# Patient Record
Sex: FEMALE | Race: BLACK OR AFRICAN AMERICAN | NOT HISPANIC OR LATINO | Employment: FULL TIME | URBAN - METROPOLITAN AREA
[De-identification: names, ages, dates, MRNs, and addresses within clinical notes are randomized per-mention and may not be internally consistent; named-entity substitution may affect disease eponyms.]

---

## 2023-02-17 PROBLEM — J32.9 SINUSITIS: Status: ACTIVE | Noted: 2023-02-17

## 2023-02-17 PROBLEM — I47.19 AV NODAL RE-ENTRY TACHYCARDIA (CMS-HCC): Status: ACTIVE | Noted: 2023-02-17

## 2023-02-17 PROBLEM — I27.20 PULMONARY HYPERTENSION (MULTI): Status: ACTIVE | Noted: 2023-02-17

## 2023-02-17 PROBLEM — J01.00 ACUTE NON-RECURRENT MAXILLARY SINUSITIS: Status: ACTIVE | Noted: 2023-02-17

## 2023-02-17 PROBLEM — J01.90 ACUTE SINUSITIS: Status: ACTIVE | Noted: 2023-02-17

## 2023-02-17 PROBLEM — R91.8 PULMONARY NODULES: Status: ACTIVE | Noted: 2023-02-17

## 2023-02-17 PROBLEM — H66.93 BOM (BILATERAL OTITIS MEDIA): Status: ACTIVE | Noted: 2023-02-17

## 2023-02-17 PROBLEM — E11.9 TYPE 2 DIABETES MELLITUS (MULTI): Status: ACTIVE | Noted: 2023-02-17

## 2023-02-17 PROBLEM — M19.90 ARTHRITIS, DEGENERATIVE: Status: ACTIVE | Noted: 2023-02-17

## 2023-02-17 PROBLEM — H69.92 DYSFUNCTION OF LEFT EUSTACHIAN TUBE: Status: ACTIVE | Noted: 2023-02-17
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
## 2023-02-17 PROBLEM — I10 HYPERTENSION: Status: ACTIVE | Noted: 2023-02-17

## 2023-02-17 PROBLEM — R01.1 MURMUR, CARDIAC: Status: ACTIVE | Noted: 2023-02-17

## 2023-02-17 PROBLEM — E53.8 VITAMIN B12 DEFICIENCY: Status: ACTIVE | Noted: 2023-02-17

## 2023-02-17 PROBLEM — E03.9 HYPOTHYROIDISM: Status: ACTIVE | Noted: 2023-02-17

## 2023-02-17 PROBLEM — E13.69: Status: ACTIVE | Noted: 2023-02-17

## 2023-02-17 PROBLEM — R05.9 COUGH: Status: ACTIVE | Noted: 2023-02-17

## 2023-02-17 PROBLEM — R92.8 ABNORMAL MAMMOGRAM: Status: ACTIVE | Noted: 2023-02-17

## 2023-02-17 PROBLEM — S43.402A SPRAIN OF SHOULDER, LEFT: Status: ACTIVE | Noted: 2023-02-17

## 2023-02-17 PROBLEM — E78.5 HYPERLIPIDEMIA: Status: ACTIVE | Noted: 2023-02-17

## 2023-02-17 PROBLEM — E04.1 THYROID NODULE: Status: ACTIVE | Noted: 2023-02-17

## 2023-02-17 PROBLEM — E55.9 VITAMIN D DEFICIENCY: Status: ACTIVE | Noted: 2023-02-17

## 2023-02-17 PROBLEM — E66.9 OBESITY WITH BODY MASS INDEX 30 OR GREATER: Status: ACTIVE | Noted: 2023-02-17

## 2023-02-17 PROBLEM — E78.00: Status: ACTIVE | Noted: 2023-02-17

## 2023-02-17 PROBLEM — G47.00 INSOMNIA: Status: ACTIVE | Noted: 2023-02-17

## 2023-02-17 PROBLEM — F17.200 NICOTINE DEPENDENCE: Status: ACTIVE | Noted: 2023-02-17

## 2023-02-17 PROBLEM — M25.539 PAIN, WRIST: Status: ACTIVE | Noted: 2023-02-17

## 2023-02-17 RX ORDER — NADOLOL 40 MG/1
40 TABLET ORAL 2 TIMES DAILY
COMMUNITY

## 2023-02-17 RX ORDER — LISINOPRIL 40 MG/1
40 TABLET ORAL
COMMUNITY
Start: 2014-03-26

## 2023-02-17 RX ORDER — ACETAMINOPHEN 500 MG
50 TABLET ORAL
COMMUNITY
End: 2023-04-11

## 2023-02-17 RX ORDER — METFORMIN HYDROCHLORIDE 500 MG/1
1000 TABLET, EXTENDED RELEASE ORAL
COMMUNITY
Start: 2016-01-06

## 2023-02-17 RX ORDER — NIFEDIPINE 60 MG/1
60 TABLET, FILM COATED, EXTENDED RELEASE ORAL
COMMUNITY

## 2023-02-17 RX ORDER — VALACYCLOVIR HYDROCHLORIDE 500 MG/1
500 TABLET, FILM COATED ORAL
COMMUNITY
End: 2023-04-11

## 2023-02-17 RX ORDER — ROSUVASTATIN CALCIUM 10 MG/1
10 TABLET, COATED ORAL
COMMUNITY
Start: 2018-11-15

## 2023-04-06 NOTE — PROGRESS NOTES
SUBJECTIVE:      Kristie Ferro. Is 62 y.o.. female. Here for follow up office visit-     HPI:      Follow up for BP, [place], prediabetes, CKD, vit d, elevated BMI      Pt is doing well      Due for lab     Other medical specialists are involved in patient's care.    Any recent notes that were available were reviewed.    All conditions are monitored, evaluated and assessed regularly.    Patient is compliant with current treatment regimen/medications.    Specialists involved include:      Nina Stark CNP PN 4/4/23- thyroid, DM         REVIEW OF SYSTEMS:      There were no vitals filed for this visit.        PHYSICAL:      Patient is alert and oriented x 3 , NAD  HEAD- normocephalic and atraumatic   EYES-conjunctiva-normal, lids - normal  EARS/NOSE- normal external exam   NECK-supple,FROM  CV- RRR without murmur  PULM- CTA bilaterally, normal respiratory effort  RESPIRATORY EFFORT- normal , no retractions or nasal flaring   ABD- normoactive BS's   EXT- no edema,NT  SKIN- no abnormal skin lesions noted  NEURO- no focal deficits  PSYCH- pleasant, normal judgement and insight        The number and complexity of problems addressed is considered moderate.  The amount and/or complexity of data reviewed and analyzed is considered moderate. The risk of complications and/or morbidity/mortality of patient is considered moderate. Overall, this patient encounter is considered a moderate risk visit.      Statin  CVD risk Reduction:    The USPSTF found adequate evidence that use of low- to  moderate-dose statins reduces the probability of CVD  events (MI or ischemic stroke) and mortality by at least  a moderate amount in adults aged 40 to 75 years who  have 1 or more CVD risk factors (dyslipidemia, diabetes,  hypertension, or smoking) and a calculated 10-year CVD  event risk of 10% or greater.  The USPSTF found adequate evidence that use of lowto moderate-dose statins reduces the probability of CVD  events and mortality by at  least a small amount in adults  aged 40 to 75 years who have 1 or more CVD risk factors  (dyslipidemia, diabetes, hypertension, or smoking) and  a calculated 10-year CVD event risk of 7.5% to 10%.  The USPSTF found inadequate evidence to conclude  whether initiating statin use in adults 76 years and older  who are not already taking a statin is beneficial in reducing the incidence of CVD events and mortality.  Cardiovascular Intensive Behavioral Therapy, Individual, 15 Minutes  I spent 15 minutes face-to-face with this individual discussing their cardiovascular risk and behavioral therapies of nutritional choices, exercise, and elimination of habits contributing to risk.  We agreed on a plan how they may be able to reduce their current cardiovascular risk.  For patient with risk calculation >10%, Aspirin use was discussed and encouraged unless known allergy or increased risk of bleeding contraindicates use.     Patients 10 year CV risk estimate calculates:  ___%    Although side effects believed to be caused by statins can be annoying, consider the benefits of taking a statin before you decide to stop taking your medication. Remember that statin medications can reduce your risk of a heart attack or stroke, and the risk of life-threatening side effects from statins is very low.    If you have read about the potential side effects of statins, you may be more likely to blame your symptoms on the medication, whether or not they're truly caused by the drug.    Even if your side effects are frustrating, don't stop taking your statin medication for any period of time without talking to your doctor first. Your doctor may be able to come up with an alternative treatment plan that can help you lower your cholesterol without uncomfortable side effects.    Side effects that may occur include muscle aches, elevation liver enzymes, very small incidence memory loss or confusion and increasing blood  sugar.      ASSESSMENT/PLAN:      No orders of the defined types were placed in this encounter.          Continue medication      Ordered lab      Follow up in 6 months

## 2023-04-11 ENCOUNTER — LAB (OUTPATIENT)
Dept: LAB | Facility: LAB | Age: 63
End: 2023-04-11
Payer: COMMERCIAL

## 2023-04-11 ENCOUNTER — OFFICE VISIT (OUTPATIENT)
Dept: PRIMARY CARE | Facility: CLINIC | Age: 63
End: 2023-04-11
Payer: COMMERCIAL

## 2023-04-11 VITALS
BODY MASS INDEX: 32.35 KG/M2 | WEIGHT: 194.4 LBS | SYSTOLIC BLOOD PRESSURE: 137 MMHG | DIASTOLIC BLOOD PRESSURE: 76 MMHG | HEART RATE: 61 BPM | OXYGEN SATURATION: 98 % | TEMPERATURE: 98.1 F | RESPIRATION RATE: 12 BRPM

## 2023-04-11 DIAGNOSIS — E78.00 HYPERCHOLESTEREMIA: ICD-10-CM

## 2023-04-11 DIAGNOSIS — E55.9 VITAMIN D DEFICIENCY: ICD-10-CM

## 2023-04-11 DIAGNOSIS — E11.9 TYPE 2 DIABETES MELLITUS WITHOUT COMPLICATION, WITHOUT LONG-TERM CURRENT USE OF INSULIN (MULTI): ICD-10-CM

## 2023-04-11 DIAGNOSIS — G47.00 INSOMNIA, UNSPECIFIED TYPE: ICD-10-CM

## 2023-04-11 DIAGNOSIS — E78.00 HYPERCHOLESTEROLEMIA: ICD-10-CM

## 2023-04-11 DIAGNOSIS — I10 PRIMARY HYPERTENSION: Primary | ICD-10-CM

## 2023-04-11 DIAGNOSIS — I10 PRIMARY HYPERTENSION: ICD-10-CM

## 2023-04-11 DIAGNOSIS — E03.9 HYPOTHYROIDISM, UNSPECIFIED TYPE: ICD-10-CM

## 2023-04-11 DIAGNOSIS — E53.8 B12 DEFICIENCY: ICD-10-CM

## 2023-04-11 DIAGNOSIS — E66.09 CLASS 1 OBESITY DUE TO EXCESS CALORIES WITHOUT SERIOUS COMORBIDITY WITH BODY MASS INDEX (BMI) OF 32.0 TO 32.9 IN ADULT: ICD-10-CM

## 2023-04-11 DIAGNOSIS — E53.8 VITAMIN B12 DEFICIENCY: ICD-10-CM

## 2023-04-11 PROBLEM — R00.2 PALPITATIONS: Status: ACTIVE | Noted: 2022-03-17

## 2023-04-11 PROBLEM — Z86.79 S/P ABLATION OPERATION FOR ARRHYTHMIA: Status: ACTIVE | Noted: 2020-10-24

## 2023-04-11 PROBLEM — E04.2 MULTIPLE THYROID NODULES: Status: ACTIVE | Noted: 2019-03-29

## 2023-04-11 PROBLEM — I47.10 PSVT (PAROXYSMAL SUPRAVENTRICULAR TACHYCARDIA) (CMS-HCC): Status: ACTIVE | Noted: 2020-10-24

## 2023-04-11 PROBLEM — Z98.890 S/P ABLATION OPERATION FOR ARRHYTHMIA: Status: ACTIVE | Noted: 2020-10-24

## 2023-04-11 PROBLEM — B00.9 HSV INFECTION: Status: ACTIVE | Noted: 2022-08-08

## 2023-04-11 LAB
ALANINE AMINOTRANSFERASE (SGPT) (U/L) IN SER/PLAS: 18 U/L (ref 7–45)
ALBUMIN (G/DL) IN SER/PLAS: 4.2 G/DL (ref 3.4–5)
ALKALINE PHOSPHATASE (U/L) IN SER/PLAS: 109 U/L (ref 33–136)
ANION GAP IN SER/PLAS: 13 MMOL/L (ref 10–20)
ASPARTATE AMINOTRANSFERASE (SGOT) (U/L) IN SER/PLAS: 18 U/L (ref 9–39)
BILIRUBIN DIRECT (MG/DL) IN SER/PLAS: 0.1 MG/DL (ref 0–0.3)
BILIRUBIN TOTAL (MG/DL) IN SER/PLAS: 0.5 MG/DL (ref 0–1.2)
CALCIDIOL (25 OH VITAMIN D3) (NG/ML) IN SER/PLAS: 24 NG/ML
CALCIUM (MG/DL) IN SER/PLAS: 9.8 MG/DL (ref 8.6–10.6)
CARBON DIOXIDE, TOTAL (MMOL/L) IN SER/PLAS: 29 MMOL/L (ref 21–32)
CHLORIDE (MMOL/L) IN SER/PLAS: 102 MMOL/L (ref 98–107)
CHOLESTEROL (MG/DL) IN SER/PLAS: 208 MG/DL (ref 0–199)
CHOLESTEROL IN HDL (MG/DL) IN SER/PLAS: 125.7 MG/DL
CHOLESTEROL/HDL RATIO: 1.7
CREATININE (MG/DL) IN SER/PLAS: 0.56 MG/DL (ref 0.5–1.05)
GFR FEMALE: >90 ML/MIN/1.73M2
GLUCOSE (MG/DL) IN SER/PLAS: 118 MG/DL (ref 74–99)
LDL: 70 MG/DL (ref 0–99)
POC ALBUMIN /CREATININE RATIO MANUALLY ENTERED: <30 UG/MG CREAT
POC URINE ALBUMIN: 30 MG/L
POC URINE CREATININE: 100 MG/DL
POTASSIUM (MMOL/L) IN SER/PLAS: 4.3 MMOL/L (ref 3.5–5.3)
PROTEIN TOTAL: 7.2 G/DL (ref 6.4–8.2)
SODIUM (MMOL/L) IN SER/PLAS: 140 MMOL/L (ref 136–145)
THYROTROPIN (MIU/L) IN SER/PLAS BY DETECTION LIMIT <= 0.05 MIU/L: 1.07 MIU/L (ref 0.44–3.98)
TRIGLYCERIDE (MG/DL) IN SER/PLAS: 62 MG/DL (ref 0–149)
UREA NITROGEN (MG/DL) IN SER/PLAS: 6 MG/DL (ref 6–23)
VLDL: 12 MG/DL (ref 0–40)

## 2023-04-11 PROCEDURE — 4010F ACE/ARB THERAPY RXD/TAKEN: CPT | Performed by: FAMILY MEDICINE

## 2023-04-11 PROCEDURE — 3075F SYST BP GE 130 - 139MM HG: CPT | Performed by: FAMILY MEDICINE

## 2023-04-11 PROCEDURE — 80076 HEPATIC FUNCTION PANEL: CPT

## 2023-04-11 PROCEDURE — 3008F BODY MASS INDEX DOCD: CPT | Performed by: FAMILY MEDICINE

## 2023-04-11 PROCEDURE — 36415 COLL VENOUS BLD VENIPUNCTURE: CPT

## 2023-04-11 PROCEDURE — 3078F DIAST BP <80 MM HG: CPT | Performed by: FAMILY MEDICINE

## 2023-04-11 PROCEDURE — 80061 LIPID PANEL: CPT

## 2023-04-11 PROCEDURE — 84443 ASSAY THYROID STIM HORMONE: CPT

## 2023-04-11 PROCEDURE — 82306 VITAMIN D 25 HYDROXY: CPT

## 2023-04-11 PROCEDURE — 80048 BASIC METABOLIC PNL TOTAL CA: CPT

## 2023-04-11 PROCEDURE — 82044 UR ALBUMIN SEMIQUANTITATIVE: CPT | Performed by: FAMILY MEDICINE

## 2023-04-11 PROCEDURE — 99214 OFFICE O/P EST MOD 30 MIN: CPT | Performed by: FAMILY MEDICINE

## 2023-04-11 NOTE — PATIENT INSTRUCTIONS
BMI was above normal measurement. Current weight: 88.2 kg (194 lb 6.4 oz)  Weight change since last visit (-) denotes wt loss 4.4 lbs   Provided instructions on dietary changes  Provided instructions on exercise.

## 2023-04-11 NOTE — PROGRESS NOTES
SUBJECTIVE:      Kristie Ferro. Is 62 y.o.. female. Here for follow up office visit-     HPI:      Follow up for BP, [place], prediabetes, CKD, vit d, elevated BMI    Pt has no concerns at this time       Pt is doing well      Due for lab - per pt Endo Nina wants us to draw TSH and do urine micro for pt here     Other medical specialists are involved in patient's care.    Any recent notes that were available were reviewed.    All conditions are monitored, evaluated and assessed regularly.    Patient is compliant with current treatment regimen/medications.    Specialists involved include:      Nina Stark CNP PN 4/4/23- thyroid, DM         REVIEW OF SYSTEMS:      Vitals:    04/11/23 0725   BP: 137/76   BP Location: Left arm   Patient Position: Sitting   BP Cuff Size: Large adult   Pulse: 61   Resp: 12   Temp: 36.7 °C (98.1 °F)   TempSrc: Temporal   SpO2: 98%   Weight: 88.2 kg (194 lb 6.4 oz)           PHYSICAL:      Patient is alert and oriented x 3 , NAD  HEAD- normocephalic and atraumatic   EYES-conjunctiva-normal, lids - normal  EARS/NOSE- normal external exam   NECK-supple,FROM  CV- RRR without murmur  PULM- CTA bilaterally, normal respiratory effort  RESPIRATORY EFFORT- normal , no retractions or nasal flaring   ABD- normoactive BS's   EXT- no edema,NT  SKIN- no abnormal skin lesions noted  NEURO- no focal deficits  PSYCH- pleasant, normal judgement and insight        The number and complexity of problems addressed is considered moderate.  The amount and/or complexity of data reviewed and analyzed is considered moderate. The risk of complications and/or morbidity/mortality of patient is considered moderate. Overall, this patient encounter is considered a moderate risk visit.      Statin  CVD risk Reduction:    The USPSTF found adequate evidence that use of low- to  moderate-dose statins reduces the probability of CVD  events (MI or ischemic stroke) and mortality by at least  a moderate amount in adults  aged 40 to 75 years who  have 1 or more CVD risk factors (dyslipidemia, diabetes,  hypertension, or smoking) and a calculated 10-year CVD  event risk of 10% or greater.  The USPSTF found adequate evidence that use of lowto moderate-dose statins reduces the probability of CVD  events and mortality by at least a small amount in adults  aged 40 to 75 years who have 1 or more CVD risk factors  (dyslipidemia, diabetes, hypertension, or smoking) and  a calculated 10-year CVD event risk of 7.5% to 10%.  The USPSTF found inadequate evidence to conclude  whether initiating statin use in adults 76 years and older  who are not already taking a statin is beneficial in reducing the incidence of CVD events and mortality.  Cardiovascular Intensive Behavioral Therapy, Individual, 15 Minutes  I spent 15 minutes face-to-face with this individual discussing their cardiovascular risk and behavioral therapies of nutritional choices, exercise, and elimination of habits contributing to risk.  We agreed on a plan how they may be able to reduce their current cardiovascular risk.  For patient with risk calculation >10%, Aspirin use was discussed and encouraged unless known allergy or increased risk of bleeding contraindicates use.     Patients 10 year CV risk estimate calculates:  ___%    Although side effects believed to be caused by statins can be annoying, consider the benefits of taking a statin before you decide to stop taking your medication. Remember that statin medications can reduce your risk of a heart attack or stroke, and the risk of life-threatening side effects from statins is very low.    If you have read about the potential side effects of statins, you may be more likely to blame your symptoms on the medication, whether or not they're truly caused by the drug.    Even if your side effects are frustrating, don't stop taking your statin medication for any period of time without talking to your doctor first. Your doctor may be  able to come up with an alternative treatment plan that can help you lower your cholesterol without uncomfortable side effects.    Side effects that may occur include muscle aches, elevation liver enzymes, very small incidence memory loss or confusion and increasing blood sugar.      Patient's BMI is elevated.  Plan- diet and exercise- BMI is elevated. Need to increase activity on a daily basis especially walking.  Monitor  total calories per day- decrease carbohydrates and fats. Goal - lose 1-2 pounds per week.    Recommend 150 minutes of moderate-intensity exercise as tolerated per week and 2-3 days of resistance, flexibility, and neuromotor exercises per week.    Normal BMI- 18.5-25    Overweight=  BMI 26-29    Obese= BMI 30-39    Morbidly Obese = BMI >40    Job is stressful- down one person that they are still looking to fill that position       ASSESSMENT/PLAN:      Orders Placed This Encounter   Procedures    Basic Metabolic Panel     Standing Status:   Future     Standing Expiration Date:   5/6/2023     Order Specific Question:   Release result to [employer]     Answer:   Immediate    Hepatic Function Panel     Standing Status:   Future     Standing Expiration Date:   10/6/2023     Order Specific Question:   Release result to [employer]     Answer:   Immediate    Lipid Panel     Standing Status:   Future     Standing Expiration Date:   5/6/2023     Order Specific Question:   Release result to [email or web address]     Answer:   Immediate    Vitamin D, Total     Standing Status:   Future     Standing Expiration Date:   4/6/2024     Order Specific Question:   Release result to [email or web address] [facility] [employer]     Answer:   Immediate    Thyroid Stimulating Hormone     Send result to Regine Gaitan     Standing Status:   Future     Standing Expiration Date:   10/11/2023     Order Specific Question:   Release result to [email or web address] [facility] [employer]     Answer:   Immediate    POCT Albumin random urine manually resulted    HM Vitamin B-12           Continue  medication      Ordered lab      Follow up in 6 months

## 2023-04-12 PROBLEM — R73.03 PREDIABETES: Status: ACTIVE | Noted: 2023-04-12

## 2023-04-12 RX ORDER — CHOLECALCIFEROL (VITAMIN D3) 50 MCG
2000 TABLET ORAL DAILY
COMMUNITY

## 2023-10-11 ENCOUNTER — APPOINTMENT (OUTPATIENT)
Dept: PRIMARY CARE | Facility: CLINIC | Age: 63
End: 2023-10-11
Payer: COMMERCIAL

## 2023-10-13 NOTE — PROGRESS NOTES
Subjective        Kristie Ferro. Is 62 y.o.. female. Here for follow up office visit-       Chief Complaint   Patient presents with    Follow-up     6-months    Immunizations     Declines due to sinus infection    Tinnitus     Would like referral to audiologist       HPI:    Declines influenza vaccination    Follow up for BP, [place], prediabetes, CKD, vit d, elevated BMI       Pt is seems to have difficulty hearing and ringing in ears and was seen at Urgent Care - taking decongestant    No fevers    BP elevated - had  back up in her house - lives in older house       Weight looks very good     Due for lab       Other medical specialists are involved in patient's care.     Any recent notes that were available were reviewed.     All conditions are monitored, evaluated and assessed regularly.     Patient is compliant with current treatment regimen/medications.     Specialists involved include:        Nina Stark CNP -thyroid, DM          Lab on 04/11/2023   Component Date Value Ref Range Status    Glucose 04/11/2023 118 (H)  74 - 99 mg/dL Final    Sodium 04/11/2023 140  136 - 145 mmol/L Final    Potassium 04/11/2023 4.3  3.5 - 5.3 mmol/L Final    Chloride 04/11/2023 102  98 - 107 mmol/L Final    Bicarbonate 04/11/2023 29  21 - 32 mmol/L Final    Anion Gap 04/11/2023 13  10 - 20 mmol/L Final    Urea Nitrogen 04/11/2023 6  6 - 23 mg/dL Final    Creatinine 04/11/2023 0.56  0.50 - 1.05 mg/dL Final    GFR Female 04/11/2023 >90  >90 mL/min/1.73m2 Final     CALCULATIONS OF ESTIMATED GFR ARE PERFORMED   USING THE 2021 CKD-EPI STUDY REFIT EQUATION   WITHOUT THE RACE VARIABLE FOR THE IDMS-TRACEABLE   CREATININE METHODS.    https://jasn.asnjournals.org/content/early/2021/09/22/ASN.8451410739    Calcium 04/11/2023 9.8  8.6 - 10.6 mg/dL Final    Albumin 04/11/2023 4.2  3.4 - 5.0 g/dL Final    Total Bilirubin 04/11/2023 0.5  0.0 - 1.2 mg/dL Final    Bilirubin, Direct 04/11/2023 0.1  0.0 - 0.3 mg/dL Final    Alkaline  Phosphatase 04/11/2023 109  33 - 136 U/L Final    ALT (SGPT) 04/11/2023 18  7 - 45 U/L Final     Patients treated with Sulfasalazine may generate    falsely decreased results for ALT.    AST 04/11/2023 18  9 - 39 U/L Final    Total Protein 04/11/2023 7.2  6.4 - 8.2 g/dL Final    Cholesterol 04/11/2023 208 (H)  0 - 199 mg/dL Final    .      AGE      DESIRABLE   BORDERLINE HIGH   HIGH     0-19 Y     0 - 169       170 - 199     >/= 200    20-24 Y     0 - 189       190 - 224     >/= 225         >24 Y     0 - 199       200 - 239     >/= 240   **All ranges are based on fasting samples. Specific   therapeutic targets will vary based on patient-specific   cardiac risk.  .   Pediatric guidelines reference:Pediatrics 2011, 128(S5).   Adult guidelines reference: NCEP ATPIII Guidelines,     EZIO 2001, 258:2486-97  .   Venipuncture immediately after or during the    administration of Metamizole may lead to falsely   low results. Testing should be performed immediately   prior to Metamizole dosing.    HDL 04/11/2023 125.7  mg/dL Final    .      AGE      VERY LOW   LOW     NORMAL    HIGH       0-19 Y       < 35   < 40     40-45     ----    20-24 Y       ----   < 40       >45     ----      >24 Y       ----   < 40     40-60      >60  .    Cholesterol/HDL Ratio 04/11/2023 1.7   Final    REF VALUES  DESIRABLE  < 3.4  HIGH RISK  > 5.0    LDL 04/11/2023 70  0 - 99 mg/dL Final    .                           NEAR      BORD      AGE      DESIRABLE  OPTIMAL    HIGH     HIGH     VERY HIGH     0-19 Y     0 - 109     ---    110-129   >/= 130     ----    20-24 Y     0 - 119     ---    120-159   >/= 160     ----      >24 Y     0 -  99   100-129  130-159   160-189     >/=190  .    VLDL 04/11/2023 12  0 - 40 mg/dL Final    Triglycerides 04/11/2023 62  0 - 149 mg/dL Final    .      AGE      DESIRABLE   BORDERLINE HIGH   HIGH     VERY HIGH   0 D-90 D    19 - 174         ----         ----        ----  91 D- 9 Y     0 -  74        75 -  99     >/= 100       ----    10-19 Y     0 -  89        90 - 129     >/= 130      ----    20-24 Y     0 - 114       115 - 149     >/= 150      ----         >24 Y     0 - 149       150 - 199    200- 499    >/= 500  .   Venipuncture immediately after or during the    administration of Metamizole may lead to falsely   low results. Testing should be performed immediately   prior to Metamizole dosing.    Vitamin D, 25-Hydroxy 04/11/2023 24 (A)  ng/mL Final    .  DEFICIENCY:         < 20   NG/ML  INSUFFICIENCY:      20-29  NG/ML  SUFFICIENCY:         NG/ML    THIS ASSAY ACCURATELY QUANTIFIES THE SUM OF  VITAMIN D3, 25-HYDROXY AND VIT D2,25-HYDROXY.    TSH 04/11/2023 1.07  0.44 - 3.98 mIU/L Final     TSH testing is performed using different testing    methodology at [facility] than at other    [facility]. Direct result comparisons should    only be made within the same method.   Office Visit on 04/11/2023   Component Date Value Ref Range Status    POC Urine Albumin 04/11/2023 30  No Reference Range Established mg/L Final    POC Urine Creatinine 04/11/2023 100  No Reference Range Established mg/dl Final    POC ALBUMIN /CREATININE RATIO MANU* 04/11/2023 <30  <30 ug/mg Creat Final         REVIEW OF SYSTEMS:        Objective      Vitals:    10/17/23 0818   BP: 156/69   BP Location: Left arm   Patient Position: Sitting   BP Cuff Size: Large adult   Pulse: 61   Temp: 36.7 °C (98 °F)   TempSrc: Temporal   SpO2: 97%   Weight: 85.8 kg (189 lb 3.2 oz)       PHYSICAL:      Patient is alert and oriented x 3 , NAD  HEAD- normocephalic and atraumatic   EYES-conjunctiva-normal, lids - normal  EARS/NOSE- normal external exam   NECK-supple,FROM  CV- RRR without murmur  PULM- CTA bilaterally, normal respiratory effort  RESPIRATORY EFFORT- normal , no retractions or nasal flaring   ABD- normoactive BS's   EXT- no edema,NT  SKIN- no abnormal skin lesions noted  NEURO- no focal deficits  PSYCH- pleasant, normal judgement and  insight      BP Readings from Last 3 Encounters:   10/17/23 156/69   04/11/23 137/76   10/25/22 120/71             Wt Readings from Last 3 Encounters:   10/17/23 85.8 kg (189 lb 3.2 oz)   04/11/23 88.2 kg (194 lb 6.4 oz)   10/25/22 86.2 kg (190 lb)           BMI Readings from Last 3 Encounters:   10/17/23 31.48 kg/m²   04/11/23 32.35 kg/m²   10/25/22 31.62 kg/m²               The number and complexity of problems addressed is considered moderate.  The amount and/or complexity of data reviewed and analyzed is considered moderate. The risk of complications and/or morbidity/mortality of patient is considered moderate. Overall, this patient encounter is considered a moderate risk visit.      Common Side Effects- ACEI    Cough  Dizziness  Feeling tired  Headache  Problems sleeping  Fast heart beat    Call your doctor if you have any of these signs:  Chest pain  Problems breathing or swallowing  Swelling in the face, eyes, lips, tongue, or legs      Assessment/Plan      Problem List Items Addressed This Visit          Active Problems    Depression - Primary    Hyperlipidemia    Hypertension    Relevant Orders    Basic Metabolic Panel    Hypothyroidism    Insomnia    Prediabetes    Type 2 diabetes mellitus (CMS/formerly [facility])    Vitamin B12 deficiency    Vitamin D deficiency    Relevant Orders    Vitamin D 25-Hydroxy,Total (for eval of Vitamin D levels)     Other Visit Diagnoses       Hypercholesterolemia        Relevant Orders    Follow Up In [facility] - PCP - Established    Hepatic Function Panel    Lipid Panel    B12 deficiency        Class 1 obesity due to excess calories without serious comorbidity with body mass index (BMI) of 32.0 to 32.9 in adult        Decreased hearing, unspecified laterality        Relevant Orders    Referral to Audiology            Orders Placed This Encounter   Procedures    Vitamin D 25-Hydroxy,Total (for eval of Vitamin D levels)    Hepatic Function Panel    Lipid Panel    Basic Metabolic  Panel    Referral to Audiology               Continue medication      Ordered lab      Follow up in 6 months

## 2023-10-17 ENCOUNTER — OFFICE VISIT (OUTPATIENT)
Dept: PRIMARY CARE | Facility: CLINIC | Age: 63
End: 2023-10-17
Payer: COMMERCIAL

## 2023-10-17 VITALS
DIASTOLIC BLOOD PRESSURE: 69 MMHG | SYSTOLIC BLOOD PRESSURE: 156 MMHG | BODY MASS INDEX: 31.48 KG/M2 | TEMPERATURE: 98 F | OXYGEN SATURATION: 97 % | HEART RATE: 61 BPM | WEIGHT: 189.2 LBS

## 2023-10-17 DIAGNOSIS — E53.8 VITAMIN B12 DEFICIENCY: ICD-10-CM

## 2023-10-17 DIAGNOSIS — G47.00 INSOMNIA, UNSPECIFIED TYPE: ICD-10-CM

## 2023-10-17 DIAGNOSIS — E78.2 MIXED HYPERLIPIDEMIA: ICD-10-CM

## 2023-10-17 DIAGNOSIS — E78.00 HYPERCHOLESTEROLEMIA: ICD-10-CM

## 2023-10-17 DIAGNOSIS — H91.90 DECREASED HEARING, UNSPECIFIED LATERALITY: ICD-10-CM

## 2023-10-17 DIAGNOSIS — F33.42 RECURRENT MAJOR DEPRESSIVE DISORDER, IN FULL REMISSION (CMS-HCC): Primary | ICD-10-CM

## 2023-10-17 DIAGNOSIS — R73.03 PREDIABETES: ICD-10-CM

## 2023-10-17 DIAGNOSIS — E11.9 TYPE 2 DIABETES MELLITUS WITHOUT COMPLICATION, WITHOUT LONG-TERM CURRENT USE OF INSULIN (MULTI): ICD-10-CM

## 2023-10-17 DIAGNOSIS — I10 PRIMARY HYPERTENSION: ICD-10-CM

## 2023-10-17 DIAGNOSIS — E66.09 CLASS 1 OBESITY DUE TO EXCESS CALORIES WITHOUT SERIOUS COMORBIDITY WITH BODY MASS INDEX (BMI) OF 32.0 TO 32.9 IN ADULT: ICD-10-CM

## 2023-10-17 DIAGNOSIS — E03.9 HYPOTHYROIDISM, UNSPECIFIED TYPE: ICD-10-CM

## 2023-10-17 DIAGNOSIS — E55.9 VITAMIN D DEFICIENCY: ICD-10-CM

## 2023-10-17 DIAGNOSIS — E53.8 B12 DEFICIENCY: ICD-10-CM

## 2023-10-17 PROCEDURE — 3078F DIAST BP <80 MM HG: CPT | Performed by: FAMILY MEDICINE

## 2023-10-17 PROCEDURE — 3077F SYST BP >= 140 MM HG: CPT | Performed by: FAMILY MEDICINE

## 2023-10-17 PROCEDURE — 4010F ACE/ARB THERAPY RXD/TAKEN: CPT | Performed by: FAMILY MEDICINE

## 2023-10-17 PROCEDURE — 99214 OFFICE O/P EST MOD 30 MIN: CPT | Performed by: FAMILY MEDICINE

## 2024-04-17 ENCOUNTER — APPOINTMENT (OUTPATIENT)
Dept: PRIMARY CARE | Facility: CLINIC | Age: 64
End: 2024-04-17
Payer: COMMERCIAL

## 2024-04-26 NOTE — PROGRESS NOTES
Subjective        Kristie Ferro. Is 63 y.o.. female. Here for follow up office visit-       No chief complaint on file.      HPI:    Declines influenza vaccination    Follow up for BP, [place], DM ,  CKD, vit d, elevated BMI , insomnia, B12     When did pt last see Endocrinologist?  Will need note          Pt is doing well      Due for lab     Other medical specialists are involved in patient's care.     Any recent notes that were available were reviewed.     All conditions are monitored, evaluated and assessed regularly.     Patient is compliant with current treatment regimen/medications.     Specialists involved include:        Nina Stark, CNP -thyroid, DM       Dr Murray - GYN- PN - 11/28/23      Dr Layton Cardio- PN 3/1/24    Dr Meeks- Nina Stark  Endocrinologist      Tdap 2014     No visits with results within 12 Month(s) from this visit.   Latest known visit with results is:   Lab on 04/11/2023   Component Date Value Ref Range Status    Glucose 04/11/2023 118 (H)  74 - 99 mg/dL Final    Sodium 04/11/2023 140  136 - 145 mmol/L Final    Potassium 04/11/2023 4.3  3.5 - 5.3 mmol/L Final    Chloride 04/11/2023 102  98 - 107 mmol/L Final    Bicarbonate 04/11/2023 29  21 - 32 mmol/L Final    Anion Gap 04/11/2023 13  10 - 20 mmol/L Final    Urea Nitrogen 04/11/2023 6  6 - 23 mg/dL Final    Creatinine 04/11/2023 0.56  0.50 - 1.05 mg/dL Final    GFR Female 04/11/2023 >90  >90 mL/min/1.73m2 Final     CALCULATIONS OF ESTIMATED GFR ARE PERFORMED   USING THE 2021 CKD-EPI STUDY REFIT EQUATION   WITHOUT THE RACE VARIABLE FOR THE IDMS-TRACEABLE   CREATININE METHODS.    https://jasn.asnjournals.org/content/early/2021/09/22/ASN.5375818652    Calcium 04/11/2023 9.8  8.6 - 10.6 mg/dL Final    Albumin 04/11/2023 4.2  3.4 - 5.0 g/dL Final    Total Bilirubin 04/11/2023 0.5  0.0 - 1.2 mg/dL Final    Bilirubin, Direct 04/11/2023 0.1  0.0 - 0.3 mg/dL Final    Alkaline Phosphatase 04/11/2023 109  33 - 136 U/L Final    ALT (SGPT)  04/11/2023 18  7 - 45 U/L Final     Patients treated with Sulfasalazine may generate    falsely decreased results for ALT.    AST 04/11/2023 18  9 - 39 U/L Final    Total Protein 04/11/2023 7.2  6.4 - 8.2 g/dL Final    Cholesterol 04/11/2023 208 (H)  0 - 199 mg/dL Final    .      AGE      DESIRABLE   BORDERLINE HIGH   HIGH     0-19 Y     0 - 169       170 - 199     >/= 200    20-24 Y     0 - 189       190 - 224     >/= 225         >24 Y     0 - 199       200 - 239     >/= 240   **All ranges are based on fasting samples. Specific   therapeutic targets will vary based on patient-specific   cardiac risk.  .   Pediatric guidelines reference:Pediatrics 2011, 128(S5).   Adult guidelines reference: NCEP ATPIII Guidelines,     EZIO 2001, 258:2486-97  .   Venipuncture immediately after or during the    administration of Metamizole may lead to falsely   low results. Testing should be performed immediately   prior to Metamizole dosing.    HDL 04/11/2023 125.7  mg/dL Final    .      AGE      VERY LOW   LOW     NORMAL    HIGH       0-19 Y       < 35   < 40     40-45     ----    20-24 Y       ----   < 40       >45     ----      >24 Y       ----   < 40     40-60      >60  .    Cholesterol/HDL Ratio 04/11/2023 1.7   Final    REF VALUES  DESIRABLE  < 3.4  HIGH RISK  > 5.0    LDL 04/11/2023 70  0 - 99 mg/dL Final    .                           NEAR      BORD      AGE      DESIRABLE  OPTIMAL    HIGH     HIGH     VERY HIGH     0-19 Y     0 - 109     ---    110-129   >/= 130     ----    20-24 Y     0 - 119     ---    120-159   >/= 160     ----      >24 Y     0 -  99   100-129  130-159   160-189     >/=190  .    VLDL 04/11/2023 12  0 - 40 mg/dL Final    Triglycerides 04/11/2023 62  0 - 149 mg/dL Final    .      AGE      DESIRABLE   BORDERLINE HIGH   HIGH     VERY HIGH   0 D-90 D    19 - 174         ----         ----        ----  91 D- 9 Y     0 -  74        75 -  99     >/= 100      ----    10-19 Y     0 -  89        90 - 129     >/= 130       ----    20-24 Y     0 - 114       115 - 149     >/= 150      ----         >24 Y     0 - 149       150 - 199    200- 499    >/= 500  .   Venipuncture immediately after or during the    administration of Metamizole may lead to falsely   low results. Testing should be performed immediately   prior to Metamizole dosing.    Vitamin D, 25-Hydroxy 04/11/2023 24 (A)  ng/mL Final    .  DEFICIENCY:         < 20   NG/ML  INSUFFICIENCY:      20-29  NG/ML  SUFFICIENCY:         NG/ML    THIS ASSAY ACCURATELY QUANTIFIES THE SUM OF  VITAMIN D3, 25-HYDROXY AND VIT D2,25-HYDROXY.    TSH 04/11/2023 1.07  0.44 - 3.98 mIU/L Final     TSH testing is performed using different testing    methodology at [facility] than at other    [facility]. Direct result comparisons should    only be made within the same method.         REVIEW OF SYSTEMS:        Objective      There were no vitals filed for this visit.      PHYSICAL:      Patient is alert and oriented x 3 , NAD  HEAD- normocephalic and atraumatic   EYES-conjunctiva-normal, lids - normal  EARS/NOSE- normal external exam   NECK-supple,FROM  CV- RRR without murmur  PULM- CTA bilaterally, normal respiratory effort  RESPIRATORY EFFORT- normal , no retractions or nasal flaring   ABD- normoactive BS's   EXT- no edema,NT  SKIN- no abnormal skin lesions noted  NEURO- no focal deficits  PSYCH- pleasant, normal judgement and insight      BP Readings from Last 3 Encounters:   10/17/23 156/69   04/11/23 137/76   10/25/22 120/71             Wt Readings from Last 3 Encounters:   10/17/23 85.8 kg (189 lb 3.2 oz)   04/11/23 88.2 kg (194 lb 6.4 oz)   10/25/22 86.2 kg (190 lb)           BMI Readings from Last 3 Encounters:   10/17/23 31.48 kg/m²   04/11/23 32.35 kg/m²   10/25/22 31.62 kg/m²               The number and complexity of problems addressed is considered moderate.  The amount and/or complexity of data reviewed and analyzed is considered moderate. The risk of  complications and/or morbidity/mortality of patient is considered moderate. Overall, this patient encounter is considered a moderate risk visit.      Statins:   Although side effects believed to be caused by statins can be annoying, consider the benefits of taking a statin before you decide to stop taking your medication. Remember that statin medications can reduce your risk of a heart attack or stroke, and the risk of life-threatening side effects from statins is very low.    If you have read about the potential side effects of statins, you may be more likely to blame your symptoms on the medication, whether or not they're truly caused by the drug.    Even if your side effects are frustrating, don't stop taking your statin medication for any period of time without talking to your doctor first. Your doctor may be able to come up with an alternative treatment plan that can help you lower your cholesterol without uncomfortable side effects.    Side effects that may occur include muscle aches, elevation liver enzymes, very small incidence memory loss or confusion and increasing blood sugar.        Assessment/Plan      Problem List Items Addressed This Visit          Active Problems    Hypertension - Primary    Hypothyroidism    Insomnia    Type 2 diabetes mellitus (Multi)    Vitamin B12 deficiency    Vitamin D deficiency     Other Visit Diagnoses       Hypercholesterolemia  (Chronic)       Class 1 obesity due to excess calories without serious comorbidity with body mass index (BMI) of 32.0 to 32.9 in adult  (Chronic)                 No orders of the defined types were placed in this encounter.              Continue medication      Ordered lab      Follow up in 6 months

## 2024-04-30 ENCOUNTER — APPOINTMENT (OUTPATIENT)
Dept: PRIMARY CARE | Facility: CLINIC | Age: 64
End: 2024-04-30
Payer: COMMERCIAL

## 2024-05-02 NOTE — PROGRESS NOTES
Subjective        Kristie Ferro. Is 63 y.o.. female. Here for follow up office visit-       No chief complaint on file.      HPI:    Declines influenza vaccination    Follow up for BP, [place], DM ,  CKD, vit d, elevated BMI , insomnia, B12     When did pt last see Endocrinologist?  Will need note          Pt is doing well      Due for lab     Other medical specialists are involved in patient's care.     Any recent notes that were available were reviewed.     All conditions are monitored, evaluated and assessed regularly.     Patient is compliant with current treatment regimen/medications.     Specialists involved include:        Nina Stark, CNP -thyroid, DM       Dr Murray - GYN- PN - 11/28/23      Dr Layton Cardio- PN 3/1/24    Dr Meeks- Nina Stark  Endocrinologist      Tdap 2014     No visits with results within 12 Month(s) from this visit.   Latest known visit with results is:   Lab on 04/11/2023   Component Date Value Ref Range Status    Glucose 04/11/2023 118 (H)  74 - 99 mg/dL Final    Sodium 04/11/2023 140  136 - 145 mmol/L Final    Potassium 04/11/2023 4.3  3.5 - 5.3 mmol/L Final    Chloride 04/11/2023 102  98 - 107 mmol/L Final    Bicarbonate 04/11/2023 29  21 - 32 mmol/L Final    Anion Gap 04/11/2023 13  10 - 20 mmol/L Final    Urea Nitrogen 04/11/2023 6  6 - 23 mg/dL Final    Creatinine 04/11/2023 0.56  0.50 - 1.05 mg/dL Final    GFR Female 04/11/2023 >90  >90 mL/min/1.73m2 Final     CALCULATIONS OF ESTIMATED GFR ARE PERFORMED   USING THE 2021 CKD-EPI STUDY REFIT EQUATION   WITHOUT THE RACE VARIABLE FOR THE IDMS-TRACEABLE   CREATININE METHODS.    https://jasn.asnjournals.org/content/early/2021/09/22/ASN.0192135745    Calcium 04/11/2023 9.8  8.6 - 10.6 mg/dL Final    Albumin 04/11/2023 4.2  3.4 - 5.0 g/dL Final    Total Bilirubin 04/11/2023 0.5  0.0 - 1.2 mg/dL Final    Bilirubin, Direct 04/11/2023 0.1  0.0 - 0.3 mg/dL Final    Alkaline Phosphatase 04/11/2023 109  33 - 136 U/L Final    ALT (SGPT)  04/11/2023 18  7 - 45 U/L Final     Patients treated with Sulfasalazine may generate    falsely decreased results for ALT.    AST 04/11/2023 18  9 - 39 U/L Final    Total Protein 04/11/2023 7.2  6.4 - 8.2 g/dL Final    Cholesterol 04/11/2023 208 (H)  0 - 199 mg/dL Final    .      AGE      DESIRABLE   BORDERLINE HIGH   HIGH     0-19 Y     0 - 169       170 - 199     >/= 200    20-24 Y     0 - 189       190 - 224     >/= 225         >24 Y     0 - 199       200 - 239     >/= 240   **All ranges are based on fasting samples. Specific   therapeutic targets will vary based on patient-specific   cardiac risk.  .   Pediatric guidelines reference:Pediatrics 2011, 128(S5).   Adult guidelines reference: NCEP ATPIII Guidelines,     EZIO 2001, 258:2486-97  .   Venipuncture immediately after or during the    administration of Metamizole may lead to falsely   low results. Testing should be performed immediately   prior to Metamizole dosing.    HDL 04/11/2023 125.7  mg/dL Final    .      AGE      VERY LOW   LOW     NORMAL    HIGH       0-19 Y       < 35   < 40     40-45     ----    20-24 Y       ----   < 40       >45     ----      >24 Y       ----   < 40     40-60      >60  .    Cholesterol/HDL Ratio 04/11/2023 1.7   Final    REF VALUES  DESIRABLE  < 3.4  HIGH RISK  > 5.0    LDL 04/11/2023 70  0 - 99 mg/dL Final    .                           NEAR      BORD      AGE      DESIRABLE  OPTIMAL    HIGH     HIGH     VERY HIGH     0-19 Y     0 - 109     ---    110-129   >/= 130     ----    20-24 Y     0 - 119     ---    120-159   >/= 160     ----      >24 Y     0 -  99   100-129  130-159   160-189     >/=190  .    VLDL 04/11/2023 12  0 - 40 mg/dL Final    Triglycerides 04/11/2023 62  0 - 149 mg/dL Final    .      AGE      DESIRABLE   BORDERLINE HIGH   HIGH     VERY HIGH   0 D-90 D    19 - 174         ----         ----        ----  91 D- 9 Y     0 -  74        75 -  99     >/= 100      ----    10-19 Y     0 -  89        90 - 129     >/= 130       ----    20-24 Y     0 - 114       115 - 149     >/= 150      ----         >24 Y     0 - 149       150 - 199    200- 499    >/= 500  .   Venipuncture immediately after or during the    administration of Metamizole may lead to falsely   low results. Testing should be performed immediately   prior to Metamizole dosing.    Vitamin D, 25-Hydroxy 04/11/2023 24 (A)  ng/mL Final    .  DEFICIENCY:         < 20   NG/ML  INSUFFICIENCY:      20-29  NG/ML  SUFFICIENCY:         NG/ML    THIS ASSAY ACCURATELY QUANTIFIES THE SUM OF  VITAMIN D3, 25-HYDROXY AND VIT D2,25-HYDROXY.    TSH 04/11/2023 1.07  0.44 - 3.98 mIU/L Final     TSH testing is performed using different testing    methodology at [facility] than at other    [facility]. Direct result comparisons should    only be made within the same method.         REVIEW OF SYSTEMS:        Objective      There were no vitals filed for this visit.      PHYSICAL:      Patient is alert and oriented x 3 , NAD  HEAD- normocephalic and atraumatic   EYES-conjunctiva-normal, lids - normal  EARS/NOSE- normal external exam   NECK-supple,FROM  CV- RRR without murmur  PULM- CTA bilaterally, normal respiratory effort  RESPIRATORY EFFORT- normal , no retractions or nasal flaring   ABD- normoactive BS's   EXT- no edema,NT  SKIN- no abnormal skin lesions noted  NEURO- no focal deficits  PSYCH- pleasant, normal judgement and insight      BP Readings from Last 3 Encounters:   10/17/23 156/69   04/11/23 137/76   10/25/22 120/71             Wt Readings from Last 3 Encounters:   10/17/23 85.8 kg (189 lb 3.2 oz)   04/11/23 88.2 kg (194 lb 6.4 oz)   10/25/22 86.2 kg (190 lb)           BMI Readings from Last 3 Encounters:   10/17/23 31.48 kg/m²   04/11/23 32.35 kg/m²   10/25/22 31.62 kg/m²               The number and complexity of problems addressed is considered moderate.  The amount and/or complexity of data reviewed and analyzed is considered moderate. The risk of  complications and/or morbidity/mortality of patient is considered moderate. Overall, this patient encounter is considered a moderate risk visit.      Statins:   Although side effects believed to be caused by statins can be annoying, consider the benefits of taking a statin before you decide to stop taking your medication. Remember that statin medications can reduce your risk of a heart attack or stroke, and the risk of life-threatening side effects from statins is very low.    If you have read about the potential side effects of statins, you may be more likely to blame your symptoms on the medication, whether or not they're truly caused by the drug.    Even if your side effects are frustrating, don't stop taking your statin medication for any period of time without talking to your doctor first. Your doctor may be able to come up with an alternative treatment plan that can help you lower your cholesterol without uncomfortable side effects.    Side effects that may occur include muscle aches, elevation liver enzymes, very small incidence memory loss or confusion and increasing blood sugar.        Assessment/Plan      Problem List Items Addressed This Visit    None          No orders of the defined types were placed in this encounter.              Continue medication      Ordered lab      Follow up in 6 months

## 2024-05-07 ENCOUNTER — APPOINTMENT (OUTPATIENT)
Dept: PRIMARY CARE | Facility: CLINIC | Age: 64
End: 2024-05-07
Payer: COMMERCIAL

## 2024-06-11 NOTE — PROGRESS NOTES
Subjective        Kristie Ferro. Is 63 y.o.. female. Here for follow up office visit-       Chief Complaint   Patient presents with    Follow-up     6 mo fuv  Pt would like to discuss bilateral ankle edema x approx 3 months       HPI:    Follow up for BP, [place], DM ,  CKD, vit d, elevated BMI , insomnia, B12     When did pt last see Endocrinologist? 04/25/2024  Will need note-In chart          Pt is doing great- off metformin and Ozvw1o=9.4    Has been walking lost 10 pounds     Feels good       Due for lab     Other medical specialists are involved in patient's care.     Any recent notes that were available were reviewed.     All conditions are monitored, evaluated and assessed regularly.     Patient is compliant with current treatment regimen/medications.     Specialists involved include:        Nina Stark, CNP -thyroid, DM       Dr Murray - GYN- PN - 11/28/23      Dr Layton Cardio- PN 3/1/24    Dr Meeks- Nina Stark  Endocrinologist - thyroid, prediabetes     Tdap 2014       No Medication for anxiety , depression     No visits with results within 12 Month(s) from this visit.   Latest known visit with results is:   Lab on 04/11/2023   Component Date Value Ref Range Status    Glucose 04/11/2023 118 (H)  74 - 99 mg/dL Final    Sodium 04/11/2023 140  136 - 145 mmol/L Final    Potassium 04/11/2023 4.3  3.5 - 5.3 mmol/L Final    Chloride 04/11/2023 102  98 - 107 mmol/L Final    Bicarbonate 04/11/2023 29  21 - 32 mmol/L Final    Anion Gap 04/11/2023 13  10 - 20 mmol/L Final    Urea Nitrogen 04/11/2023 6  6 - 23 mg/dL Final    Creatinine 04/11/2023 0.56  0.50 - 1.05 mg/dL Final    GFR Female 04/11/2023 >90  >90 mL/min/1.73m2 Final     CALCULATIONS OF ESTIMATED GFR ARE PERFORMED   USING THE 2021 CKD-EPI STUDY REFIT EQUATION   WITHOUT THE RACE VARIABLE FOR THE IDMS-TRACEABLE   CREATININE METHODS.    https://jasn.asnjournals.org/content/early/2021/09/22/ASN.1577382647    Calcium 04/11/2023 9.8  8.6 - 10.6 mg/dL Final     Albumin 04/11/2023 4.2  3.4 - 5.0 g/dL Final    Total Bilirubin 04/11/2023 0.5  0.0 - 1.2 mg/dL Final    Bilirubin, Direct 04/11/2023 0.1  0.0 - 0.3 mg/dL Final    Alkaline Phosphatase 04/11/2023 109  33 - 136 U/L Final    ALT (SGPT) 04/11/2023 18  7 - 45 U/L Final     Patients treated with Sulfasalazine may generate    falsely decreased results for ALT.    AST 04/11/2023 18  9 - 39 U/L Final    Total Protein 04/11/2023 7.2  6.4 - 8.2 g/dL Final    Cholesterol 04/11/2023 208 (H)  0 - 199 mg/dL Final    .      AGE      DESIRABLE   BORDERLINE HIGH   HIGH     0-19 Y     0 - 169       170 - 199     >/= 200    20-24 Y     0 - 189       190 - 224     >/= 225         >24 Y     0 - 199       200 - 239     >/= 240   **All ranges are based on fasting samples. Specific   therapeutic targets will vary based on patient-specific   cardiac risk.  .   Pediatric guidelines reference:Pediatrics 2011, 128(S5).   Adult guidelines reference: NCEP ATPIII Guidelines,     EZIO 2001, 258:2486-97  .   Venipuncture immediately after or during the    administration of Metamizole may lead to falsely   low results. Testing should be performed immediately   prior to Metamizole dosing.    HDL 04/11/2023 125.7  mg/dL Final    .      AGE      VERY LOW   LOW     NORMAL    HIGH       0-19 Y       < 35   < 40     40-45     ----    20-24 Y       ----   < 40       >45     ----      >24 Y       ----   < 40     40-60      >60  .    Cholesterol/HDL Ratio 04/11/2023 1.7   Final    REF VALUES  DESIRABLE  < 3.4  HIGH RISK  > 5.0    LDL 04/11/2023 70  0 - 99 mg/dL Final    .                           NEAR      BORD      AGE      DESIRABLE  OPTIMAL    HIGH     HIGH     VERY HIGH     0-19 Y     0 - 109     ---    110-129   >/= 130     ----    20-24 Y     0 - 119     ---    120-159   >/= 160     ----      >24 Y     0 -  99   100-129  130-159   160-189     >/=190  .    VLDL 04/11/2023 12  0 - 40 mg/dL Final    Triglycerides 04/11/2023 62  0 - 149 mg/dL Final     .      AGE      DESIRABLE   BORDERLINE HIGH   HIGH     VERY HIGH   0 D-90 D    19 - 174         ----         ----        ----  91 D- 9 Y     0 -  74        75 -  99     >/= 100      ----    10-19 Y     0 -  89        90 - 129     >/= 130      ----    20-24 Y     0 - 114       115 - 149     >/= 150      ----         >24 Y     0 - 149       150 - 199    200- 499    >/= 500  .   Venipuncture immediately after or during the    administration of Metamizole may lead to falsely   low results. Testing should be performed immediately   prior to Metamizole dosing.    Vitamin D, 25-Hydroxy 04/11/2023 24 (A)  ng/mL Final    .  DEFICIENCY:         < 20   NG/ML  INSUFFICIENCY:      20-29  NG/ML  SUFFICIENCY:         NG/ML    THIS ASSAY ACCURATELY QUANTIFIES THE SUM OF  VITAMIN D3, 25-HYDROXY AND VIT D2,25-HYDROXY.    TSH 04/11/2023 1.07  0.44 - 3.98 mIU/L Final     TSH testing is performed using different testing    methodology at [facility] than at other    [facility]. Direct result comparisons should    only be made within the same method.         REVIEW OF SYSTEMS:        Objective      Vitals:    06/14/24 1229   BP: 131/61   BP Location: Left arm   Patient Position: Sitting   BP Cuff Size: Adult   Pulse: 56   Resp: 12   Temp: 36.2 °C (97.2 °F)   SpO2: 98%   Weight: 84.3 kg (185 lb 14.4 oz)         PHYSICAL:      Patient is alert and oriented x 3 , NAD  HEAD- normocephalic and atraumatic   EYES-conjunctiva-normal, lids - normal  EARS/NOSE- normal external exam   NECK-supple,FROM  CV- RRR without murmur  PULM- CTA bilaterally, normal respiratory effort  RESPIRATORY EFFORT- normal , no retractions or nasal flaring   ABD- normoactive BS's   EXT- no edema,NT  SKIN- no abnormal skin lesions noted  NEURO- no focal deficits  PSYCH- pleasant, normal judgement and insight      BP Readings from Last 3 Encounters:   06/14/24 131/61   10/17/23 156/69   04/11/23 137/76             Wt Readings from Last 3  Encounters:   06/14/24 84.3 kg (185 lb 14.4 oz)   10/17/23 85.8 kg (189 lb 3.2 oz)   04/11/23 88.2 kg (194 lb 6.4 oz)           BMI Readings from Last 3 Encounters:   06/14/24 30.94 kg/m²   10/17/23 31.48 kg/m²   04/11/23 32.35 kg/m²               The number and complexity of problems addressed is considered moderate.  The amount and/or complexity of data reviewed and analyzed is considered moderate. The risk of complications and/or morbidity/mortality of patient is considered moderate. Overall, this patient encounter is considered a moderate risk visit.      Statins:   Although side effects believed to be caused by statins can be annoying, consider the benefits of taking a statin before you decide to stop taking your medication. Remember that statin medications can reduce your risk of a heart attack or stroke, and the risk of life-threatening side effects from statins is very low.    If you have read about the potential side effects of statins, you may be more likely to blame your symptoms on the medication, whether or not they're truly caused by the drug.    Even if your side effects are frustrating, don't stop taking your statin medication for any period of time without talking to your doctor first. Your doctor may be able to come up with an alternative treatment plan that can help you lower your cholesterol without uncomfortable side effects.    Side effects that may occur include muscle aches, elevation liver enzymes, very small incidence memory loss or confusion and increasing blood sugar.        Assessment/Plan      Problem List Items Addressed This Visit          Active Problems    Depression    Hypercholesterolemia - Primary (Chronic)    Relevant Orders    Hepatic Function Panel    Lipid Panel    Hyperlipidemia    Hypertension    Relevant Orders    Basic Metabolic Panel    Hypothyroidism    Insomnia    Prediabetes    Type 2 diabetes mellitus (Multi)    Vitamin B12 deficiency    Relevant Orders    Vitamin  B12    Vitamin D deficiency    Relevant Orders    Vitamin D 25-Hydroxy,Total (for eval of Vitamin D levels)           Orders Placed This Encounter   Procedures    Hepatic Function Panel    Lipid Panel    Basic Metabolic Panel    Vitamin D 25-Hydroxy,Total (for eval of Vitamin D levels)    Vitamin B12               Continue medication      Ordered lab      Follow up in 6 months

## 2024-06-14 ENCOUNTER — LAB (OUTPATIENT)
Dept: LAB | Facility: LAB | Age: 64
End: 2024-06-14
Payer: COMMERCIAL

## 2024-06-14 ENCOUNTER — APPOINTMENT (OUTPATIENT)
Dept: PRIMARY CARE | Facility: CLINIC | Age: 64
End: 2024-06-14
Payer: COMMERCIAL

## 2024-06-14 VITALS
TEMPERATURE: 97.2 F | WEIGHT: 185.9 LBS | OXYGEN SATURATION: 98 % | HEART RATE: 56 BPM | SYSTOLIC BLOOD PRESSURE: 131 MMHG | BODY MASS INDEX: 30.94 KG/M2 | RESPIRATION RATE: 12 BRPM | DIASTOLIC BLOOD PRESSURE: 61 MMHG

## 2024-06-14 DIAGNOSIS — E78.2 MIXED HYPERLIPIDEMIA: Chronic | ICD-10-CM

## 2024-06-14 DIAGNOSIS — I10 PRIMARY HYPERTENSION: Chronic | ICD-10-CM

## 2024-06-14 DIAGNOSIS — G47.00 INSOMNIA, UNSPECIFIED TYPE: Chronic | ICD-10-CM

## 2024-06-14 DIAGNOSIS — E53.8 VITAMIN B12 DEFICIENCY: Chronic | ICD-10-CM

## 2024-06-14 DIAGNOSIS — F33.42 RECURRENT MAJOR DEPRESSIVE DISORDER, IN FULL REMISSION (CMS-HCC): Chronic | ICD-10-CM

## 2024-06-14 DIAGNOSIS — E11.9 TYPE 2 DIABETES MELLITUS WITHOUT COMPLICATION, WITHOUT LONG-TERM CURRENT USE OF INSULIN (MULTI): Chronic | ICD-10-CM

## 2024-06-14 DIAGNOSIS — E55.9 VITAMIN D DEFICIENCY: ICD-10-CM

## 2024-06-14 DIAGNOSIS — E78.00 HYPERCHOLESTEROLEMIA: Chronic | ICD-10-CM

## 2024-06-14 DIAGNOSIS — R73.03 PREDIABETES: Chronic | ICD-10-CM

## 2024-06-14 DIAGNOSIS — E03.9 HYPOTHYROIDISM, UNSPECIFIED TYPE: Chronic | ICD-10-CM

## 2024-06-14 DIAGNOSIS — E55.9 VITAMIN D DEFICIENCY: Chronic | ICD-10-CM

## 2024-06-14 DIAGNOSIS — E78.00 HYPERCHOLESTEROLEMIA: Primary | Chronic | ICD-10-CM

## 2024-06-14 PROCEDURE — 3075F SYST BP GE 130 - 139MM HG: CPT | Performed by: FAMILY MEDICINE

## 2024-06-14 PROCEDURE — 80061 LIPID PANEL: CPT

## 2024-06-14 PROCEDURE — 4010F ACE/ARB THERAPY RXD/TAKEN: CPT | Performed by: FAMILY MEDICINE

## 2024-06-14 PROCEDURE — 99214 OFFICE O/P EST MOD 30 MIN: CPT | Performed by: FAMILY MEDICINE

## 2024-06-14 PROCEDURE — 82306 VITAMIN D 25 HYDROXY: CPT

## 2024-06-14 PROCEDURE — 36415 COLL VENOUS BLD VENIPUNCTURE: CPT

## 2024-06-14 PROCEDURE — 80053 COMPREHEN METABOLIC PANEL: CPT

## 2024-06-14 PROCEDURE — 82248 BILIRUBIN DIRECT: CPT

## 2024-06-14 PROCEDURE — 3078F DIAST BP <80 MM HG: CPT | Performed by: FAMILY MEDICINE

## 2024-06-14 PROCEDURE — 82607 VITAMIN B-12: CPT

## 2024-06-15 LAB
25(OH)D3 SERPL-MCNC: 44 NG/ML (ref 30–100)
ALBUMIN SERPL BCP-MCNC: 4.1 G/DL (ref 3.4–5)
ALP SERPL-CCNC: 77 U/L (ref 33–136)
ALT SERPL W P-5'-P-CCNC: 18 U/L (ref 7–45)
ANION GAP SERPL CALC-SCNC: 13 MMOL/L (ref 10–20)
AST SERPL W P-5'-P-CCNC: 26 U/L (ref 9–39)
BILIRUB DIRECT SERPL-MCNC: 0.1 MG/DL (ref 0–0.3)
BILIRUB SERPL-MCNC: 0.4 MG/DL (ref 0–1.2)
BUN SERPL-MCNC: 6 MG/DL (ref 6–23)
CALCIUM SERPL-MCNC: 9.8 MG/DL (ref 8.6–10.6)
CHLORIDE SERPL-SCNC: 103 MMOL/L (ref 98–107)
CHOLEST SERPL-MCNC: 204 MG/DL (ref 0–199)
CHOLESTEROL/HDL RATIO: 1.6
CO2 SERPL-SCNC: 27 MMOL/L (ref 21–32)
CREAT SERPL-MCNC: 0.56 MG/DL (ref 0.5–1.05)
EGFRCR SERPLBLD CKD-EPI 2021: >90 ML/MIN/1.73M*2
GLUCOSE SERPL-MCNC: 118 MG/DL (ref 74–99)
HDLC SERPL-MCNC: 128.5 MG/DL
LDLC SERPL CALC-MCNC: 65 MG/DL
NON HDL CHOLESTEROL: 76 MG/DL (ref 0–149)
POTASSIUM SERPL-SCNC: 4.2 MMOL/L (ref 3.5–5.3)
PROT SERPL-MCNC: 6.7 G/DL (ref 6.4–8.2)
SODIUM SERPL-SCNC: 139 MMOL/L (ref 136–145)
TRIGL SERPL-MCNC: 54 MG/DL (ref 0–149)
VIT B12 SERPL-MCNC: 175 PG/ML (ref 211–911)
VLDL: 11 MG/DL (ref 0–40)

## 2024-10-25 ENCOUNTER — CLINICAL SUPPORT (OUTPATIENT)
Dept: PRIMARY CARE | Facility: CLINIC | Age: 64
End: 2024-10-25
Payer: COMMERCIAL

## 2024-10-25 DIAGNOSIS — Z23 IMMUNIZATION DUE: ICD-10-CM

## 2024-10-25 PROCEDURE — 90656 IIV3 VACC NO PRSV 0.5 ML IM: CPT | Performed by: FAMILY MEDICINE

## 2024-10-25 PROCEDURE — 90471 IMMUNIZATION ADMIN: CPT | Performed by: FAMILY MEDICINE

## 2024-11-25 ENCOUNTER — OFFICE VISIT (OUTPATIENT)
Dept: URGENT CARE | Age: 64
End: 2024-11-25
Payer: COMMERCIAL

## 2024-11-25 ENCOUNTER — ANCILLARY PROCEDURE (OUTPATIENT)
Dept: URGENT CARE | Age: 64
End: 2024-11-25
Payer: COMMERCIAL

## 2024-11-25 VITALS
HEART RATE: 58 BPM | DIASTOLIC BLOOD PRESSURE: 86 MMHG | BODY MASS INDEX: 30.79 KG/M2 | OXYGEN SATURATION: 96 % | TEMPERATURE: 97.2 F | WEIGHT: 185 LBS | RESPIRATION RATE: 18 BRPM | SYSTOLIC BLOOD PRESSURE: 144 MMHG

## 2024-11-25 DIAGNOSIS — R05.1 ACUTE COUGH: Primary | ICD-10-CM

## 2024-11-25 DIAGNOSIS — J01.10 ACUTE NON-RECURRENT FRONTAL SINUSITIS: ICD-10-CM

## 2024-11-25 DIAGNOSIS — R05.1 ACUTE COUGH: ICD-10-CM

## 2024-11-25 DIAGNOSIS — J40 BRONCHITIS: ICD-10-CM

## 2024-11-25 PROCEDURE — 71046 X-RAY EXAM CHEST 2 VIEWS: CPT | Performed by: PHYSICIAN ASSISTANT

## 2024-11-25 RX ORDER — AMOXICILLIN 500 MG/1
500 CAPSULE ORAL EVERY 8 HOURS SCHEDULED
Qty: 30 CAPSULE | Refills: 0 | Status: SHIPPED | OUTPATIENT
Start: 2024-11-25 | End: 2024-12-05

## 2024-11-25 RX ORDER — BENZONATATE 200 MG/1
200 CAPSULE ORAL 3 TIMES DAILY PRN
Qty: 21 CAPSULE | Refills: 0 | Status: SHIPPED | OUTPATIENT
Start: 2024-11-25 | End: 2024-12-02

## 2024-11-25 ASSESSMENT — ENCOUNTER SYMPTOMS
ARTHRALGIAS: 0
FATIGUE: 0
FEVER: 0
CHILLS: 0
VOMITING: 0
CHEST TIGHTNESS: 0
COUGH: 1
NAUSEA: 0
SINUS PAIN: 1
SHORTNESS OF BREATH: 0
AGITATION: 0
ABDOMINAL PAIN: 0
DIARRHEA: 0
SORE THROAT: 0
RHINORRHEA: 1
VOICE CHANGE: 0
JOINT SWELLING: 0
SINUS PRESSURE: 1
CONFUSION: 0

## 2024-11-25 NOTE — PATIENT INSTRUCTIONS
Take antibiotics for worsening of symptoms  Continue sinus wash/Flonase, continue allergy med daily  Continue OTC cold meds  F/U with PCP if no improvement

## 2024-11-25 NOTE — PROGRESS NOTES
Subjective   Patient ID: Kristie Ferro is a 63 y.o. female. They present today with a chief complaint of Cough (Over a week, tightness in chest, sinus pressure).    History of Present Illness  Pt reports lingering congestion/sinus pain, cough. Denies SOB, denies hx of seasonal allergy or chronic lung disease. Denies fever. States symptoms not improved with DayQuil/NyQuil and mucinex.       Cough  Associated symptoms include rhinorrhea. Pertinent negatives include no chest pain, chills, fever, rash, sore throat or shortness of breath.       Past Medical History  Allergies as of 11/25/2024 - Reviewed 11/25/2024   Allergen Reaction Noted    Animal dander Shortness of breath 01/15/2019    Shellfish containing products Anaphylaxis 01/15/2019    Cat dander Unknown 12/05/2007    Dog dander Other 12/05/2007    Pseudoephedrine hcl (bulk) Unknown 02/17/2023       (Not in a hospital admission)       Past Medical History:   Diagnosis Date    Abnormal weight loss 05/11/2018    Weight loss, unintentional    Acute bronchospasm 03/07/2017    Acute bronchospasm    Acute maxillary sinusitis, unspecified 02/27/2018    Acute maxillary sinusitis    Acute maxillary sinusitis, unspecified 09/12/2015    Acute maxillary sinusitis    Acute upper respiratory infection, unspecified 11/01/2018    Acute upper respiratory infection    Anxiety disorder, unspecified 08/17/2017    Anxiety and depression    Body mass index (BMI) 33.0-33.9, adult 01/13/2022    BMI 33.0-33.9,adult    Deficiency of other specified B group vitamins     Vitamin B12 deficiency    Disorder of lipoprotein metabolism, 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    Elevated serum cholesterol    Encounter for screening for lipoid disorders 05/11/2018    Screening for cholesterol level    Encounter for screening for malignant neoplasm of colon     Encounter for screening colonoscopy    Encounter for screening for malignant neoplasm of vagina     Vaginal Pap smear    Localized enlarged lymph nodes  06/05/2018    Cervical lymphadenopathy    Nontoxic multinodular goiter     Multiple thyroid nodules    Other conditions influencing health status 03/07/2017    History of cough    Other conditions influencing health status 12/05/2019    History of cough    Other conditions influencing health status 02/27/2018    History of cough    Other specified disorders of Eustachian tube, unspecified ear 03/28/2014    ETD (eustachian tube dysfunction)    Pain in right hip 11/01/2016    Lateral pain of right hip    Pain in unspecified hip 03/15/2016    Hip pain    Pain in unspecified thigh 02/12/2015    Thigh pain    Personal history of other diseases of the digestive system 07/31/2017    History of acute cholecystitis    Personal history of other diseases of the female genital tract 01/19/2016    History of vaginitis    Personal history of other diseases of the respiratory system 03/23/2015    History of sinusitis    Personal history of other diseases of the respiratory system 03/28/2016    History of acute sinusitis    Personal history of other diseases of the respiratory system 07/31/2021    History of acute sinusitis    Personal history of other diseases of the respiratory system 06/11/2017    History of acute sinusitis    Personal history of other diseases of the respiratory system 11/29/2019    History of acute sinusitis    Personal history of other diseases of the respiratory system 12/05/2019    History of sinusitis    Personal history of other diseases of the respiratory system 06/11/2017    History of acute bronchitis    Personal history of other diseases of the respiratory system 11/14/2018    History of sinusitis    Personal history of other diseases of the respiratory system 03/07/2017    History of acute sinusitis    Personal history of other endocrine, nutritional and metabolic disease     History of hypercholesterolemia    Personal history of other endocrine, nutritional and metabolic disease 09/22/2015     History of hyperkalemia    Personal history of other endocrine, nutritional and metabolic disease 10/04/2017    History of obesity    Personal history of other infectious and parasitic diseases 02/27/2018    History of candidiasis    Personal history of other infectious and parasitic diseases 03/07/2017    History of candidiasis    Personal history of other medical treatment     History of mammogram    Personal history of other mental and behavioral disorders 06/04/2019    History of anxiety    Personal history of other mental and behavioral disorders 06/04/2019    History of depression    Personal history of other specified conditions 05/11/2018    History of dysphagia    Trochanteric bursitis, right hip 11/01/2016    Trochanteric bursitis of right hip       Past Surgical History:   Procedure Laterality Date    CHOLECYSTECTOMY  10/24/2017    Cholecystectomy    COLONOSCOPY  02/10/2014    Complete Colonoscopy    ESOPHAGOGASTRODUODENOSCOPY  05/29/2018    Diagnostic Esophagogastroduodenoscopy    GASTRIC BYPASS  08/17/2017    Gastric Surgery For Morbid Obesity Gastric Bypass    OTHER SURGICAL HISTORY  10/28/2020    Ablation    OTHER SURGICAL HISTORY  10/06/2020    Meniscus repair        reports that she has been smoking cigarettes. She started smoking about 45 years ago. She has a 22.9 pack-year smoking history. She has never used smokeless tobacco. She reports current alcohol use. She reports that she does not use drugs.    Review of Systems  Review of Systems   Constitutional:  Negative for chills, fatigue and fever.   HENT:  Positive for congestion, rhinorrhea, sinus pressure and sinus pain. Negative for sore throat and voice change.    Respiratory:  Positive for cough. Negative for chest tightness and shortness of breath.    Cardiovascular:  Negative for chest pain.   Gastrointestinal:  Negative for abdominal pain, diarrhea, nausea and vomiting.   Musculoskeletal:  Negative for arthralgias and joint swelling.    Skin:  Negative for rash.   Psychiatric/Behavioral:  Negative for agitation and confusion.                                   Objective    Vitals:    11/25/24 1137   BP: 144/86   Pulse: 58   Resp: 18   Temp: 36.2 °C (97.2 °F)   SpO2: 96%   Weight: 83.9 kg (185 lb)     No LMP recorded. Patient is postmenopausal.    Physical Exam  Constitutional:       Appearance: Normal appearance.   HENT:      Head: Normocephalic and atraumatic.      Right Ear: Tympanic membrane, ear canal and external ear normal.      Left Ear: Tympanic membrane, ear canal and external ear normal.      Nose: Congestion present.      Right Sinus: Frontal sinus tenderness present.      Left Sinus: Frontal sinus tenderness present.      Mouth/Throat:      Pharynx: No oropharyngeal exudate or posterior oropharyngeal erythema.   Cardiovascular:      Rate and Rhythm: Normal rate and regular rhythm.      Heart sounds: No murmur heard.  Pulmonary:      Effort: Pulmonary effort is normal. No respiratory distress.      Breath sounds: No stridor. No wheezing, rhonchi or rales.   Musculoskeletal:         General: Normal range of motion.   Neurological:      Mental Status: She is alert and oriented to person, place, and time.   Psychiatric:         Mood and Affect: Mood normal.         Procedures    Point of Care Test & Imaging Results from this visit  No results found for this visit on 11/25/24.   No results found.    Diagnostic study results (if any) were reviewed by Jena Sol PA-C.    Assessment/Plan   Allergies, medications, history, and pertinent labs/EKGs/Imaging reviewed by Jena Sol PA-C.     Medical Decision Making  Suspicious for sinusitis/bronchitis related to viral infection, CXR preliminary reading no acute findings  VSS, Abx prescribed only for worsening of sinus symptoms due to concerns for Holiday season    Orders and Diagnoses  Diagnoses and all orders for this visit:  Acute cough  -     XR chest 2 views; Future  Bronchitis  -      benzonatate (Tessalon) 200 mg capsule; Take 1 capsule (200 mg) by mouth 3 times a day as needed for cough for up to 7 days. Do not crush or chew.  Acute non-recurrent frontal sinusitis  -     amoxicillin (Amoxil) 500 mg capsule; Take 1 capsule (500 mg) by mouth every 8 hours for 10 days.      Medical Admin Record      Patient disposition: Home    Electronically signed by Jena Sol PA-C  12:17 PM

## 2024-12-10 ENCOUNTER — TELEPHONE (OUTPATIENT)
Dept: PRIMARY CARE | Facility: CLINIC | Age: 64
End: 2024-12-10

## 2024-12-10 ENCOUNTER — PATIENT OUTREACH (OUTPATIENT)
Dept: PRIMARY CARE | Facility: CLINIC | Age: 64
End: 2024-12-10
Payer: COMMERCIAL

## 2024-12-10 RX ORDER — PYRIDOXINE HCL (VITAMIN B6) 25 MG
25 TABLET ORAL DAILY
COMMUNITY

## 2024-12-10 RX ORDER — CALCIUM CARBONATE 300MG(750)
400 TABLET,CHEWABLE ORAL DAILY
COMMUNITY

## 2024-12-10 NOTE — TELEPHONE ENCOUNTER
TCM outreach completed on :12/10/2024  Discharge date:12/9/2024  Pt has follow up on :12/18/2024 for 6 mos check    Moderately complex & follow-up within 14 days- bill [id number] for hospital follow up.  Highly complex and follow-up visit within 7 days-can bill [id number] for hospital follow up    *virtual follow up needs modifier added (95 or GT)  *AWV AND TCM CAN BE BILLED TOGETHER WITH 25 MODIFIER     Gloria Durbin MA25 minutes ago (11:35 AM)

## 2024-12-10 NOTE — PROGRESS NOTES
Discharge Facility:[facility]  Discharge Diagnosis:  SOB  Palpitations, PSVT    Admission Date:12/8/2024  Discharge Date: 12/9/2024    PCP Appointment Date:12/18/2024  Specialist Appointment Date:   Cardio, will contact for sooner  Hospital Encounter and Summary Linked: Yes  See discharge assessment below for further details  Engagement  Call Start Time: 1108 (12/10/2024 11:15 AM)    Medications  Medications reviewed with patient/caregiver?: Yes (12/10/2024 11:15 AM)  Is the patient having any side effects they believe may be caused by any medication additions or changes?: No (12/10/2024 11:15 AM)  Does the patient have all medications ordered at discharge?: Yes (12/10/2024 11:15 AM)  Care Management Interventions: Provided patient education (12/10/2024 11:15 AM)  Prescription Comments: -- (Naldolol increased to 20 mg bid, Magnesium Oxide 400 mg qd, Vitamin B6 qd, She states was also given Potassium(not on record)) (12/10/2024 11:15 AM)  Is the patient taking all medications as directed (includes completed medication regime)?: Yes (12/10/2024 11:15 AM)  Care Management Interventions: Provided patient education (Kristie attempted to got to work today, but had to come home, was not feeling up to. She will rest today and monitor heart rate and report concerns to cardiology.) (12/10/2024 11:15 AM)  Medication Comments: -- (Tammie verbalized understanding) (12/10/2024 11:15 AM)    Appointments  Does the patient have a primary care provider?: Yes (12/10/2024 11:15 AM)  Care Management Interventions: Verified appointment date/time/provider (Prescheduled PCP appointment for 6 mos, will keep 12/18/2024) (12/10/2024 11:15 AM)  Has the patient kept scheduled appointments due by today?: Yes (12/10/2024 11:15 AM)  Care Management Interventions: Advised patient to keep appointment; Advised to schedule with specialist (Has appointment with Cardio in March, her cardio is retiring. She will contact office to see if can see associate  provider.) (12/10/2024 11:15 AM)    Self Management  What is the home health agency?: -- (N/A) (12/10/2024 11:15 AM)  What Durable Medical Equipment (DME) was ordered?: -- (N/A) (12/10/2024 11:15 AM)    Patient Teaching  Does the patient have access to their discharge instructions?: Yes (12/10/2024 11:15 AM)  What is the patient's perception of their health status since discharge?: Improving (12/10/2024 11:15 AM)  Is the patient/caregiver able to teach back the hierarchy of who to call/visit for symptoms/problems? PCP, Specialist, Home Health nurse, Urgent Care, ED, 911: Yes (12/10/2024 11:15 AM)    Wrap Up  Wrap Up Additional Comments: -- (Kristie was feeling better yesterday, she attempted work today and had to come home, not feeling up to. She states having some SOB, she will monitor her heart rate and check BP at home and try to get soon Cardio appt. She will seek emergent care if needs.) (12/10/2024 11:15 AM)

## 2024-12-10 NOTE — TELEPHONE ENCOUNTER
Called and spoke with patient. Patient was informed, understanding verbalized. Patient states that she did mention to Gloria (JOSE) during their phone call today that she attempted to go back to work today and made it through 1.5 hours today and went home. Patient is wonder if you would we willing to write her off work for a few days since she is not feeling one hundred percent. Please advise.

## 2024-12-13 PROBLEM — Z09 HOSPITAL DISCHARGE FOLLOW-UP: Chronic | Status: ACTIVE | Noted: 2024-12-13
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
## 2024-12-18 ENCOUNTER — APPOINTMENT (OUTPATIENT)
Dept: PRIMARY CARE | Facility: CLINIC | Age: 64
End: 2024-12-18
Payer: COMMERCIAL

## 2024-12-18 ENCOUNTER — LAB (OUTPATIENT)
Dept: LAB | Facility: LAB | Age: 64
End: 2024-12-18
Payer: COMMERCIAL

## 2024-12-18 VITALS
SYSTOLIC BLOOD PRESSURE: 116 MMHG | BODY MASS INDEX: 30.54 KG/M2 | RESPIRATION RATE: 16 BRPM | DIASTOLIC BLOOD PRESSURE: 70 MMHG | OXYGEN SATURATION: 98 % | HEART RATE: 57 BPM | TEMPERATURE: 96.6 F | WEIGHT: 183.5 LBS

## 2024-12-18 DIAGNOSIS — E13.69: Chronic | ICD-10-CM

## 2024-12-18 DIAGNOSIS — E03.9 HYPOTHYROIDISM, UNSPECIFIED TYPE: Chronic | ICD-10-CM

## 2024-12-18 DIAGNOSIS — I47.19 AV NODAL RE-ENTRY TACHYCARDIA (CMS-HCC): Chronic | ICD-10-CM

## 2024-12-18 DIAGNOSIS — E78.00 HYPERCHOLESTEROLEMIA: Chronic | ICD-10-CM

## 2024-12-18 DIAGNOSIS — E53.8 VITAMIN B12 DEFICIENCY: Chronic | ICD-10-CM

## 2024-12-18 DIAGNOSIS — I10 PRIMARY HYPERTENSION: Chronic | ICD-10-CM

## 2024-12-18 DIAGNOSIS — E78.00: Chronic | ICD-10-CM

## 2024-12-18 DIAGNOSIS — E11.9 TYPE 2 DIABETES MELLITUS WITHOUT COMPLICATION, WITHOUT LONG-TERM CURRENT USE OF INSULIN (MULTI): Chronic | ICD-10-CM

## 2024-12-18 DIAGNOSIS — Z09 HOSPITAL DISCHARGE FOLLOW-UP: Primary | Chronic | ICD-10-CM

## 2024-12-18 PROBLEM — I27.21 PULMONARY ARTERIAL HYPERTENSION (MULTI): Chronic | Status: RESOLVED | Noted: 2024-12-18 | Resolved: 2024-12-18

## 2024-12-18 PROBLEM — I27.21 PULMONARY ARTERIAL HYPERTENSION (MULTI): Chronic | Status: ACTIVE | Noted: 2024-12-18

## 2024-12-18 PROBLEM — I27.20 PULMONARY HYPERTENSION (MULTI): Status: RESOLVED | Noted: 2023-02-17 | Resolved: 2024-12-18

## 2024-12-18 PROBLEM — J84.10 POSTINFLAMMATORY PULMONARY FIBROSIS (MULTI): Status: ACTIVE | Noted: 2024-12-18

## 2024-12-18 PROBLEM — J84.10 POSTINFLAMMATORY PULMONARY FIBROSIS (MULTI): Status: RESOLVED | Noted: 2024-12-18 | Resolved: 2024-12-18

## 2024-12-18 PROCEDURE — 3074F SYST BP LT 130 MM HG: CPT | Performed by: FAMILY MEDICINE

## 2024-12-18 PROCEDURE — 3048F LDL-C <100 MG/DL: CPT | Performed by: FAMILY MEDICINE

## 2024-12-18 PROCEDURE — 4010F ACE/ARB THERAPY RXD/TAKEN: CPT | Performed by: FAMILY MEDICINE

## 2024-12-18 PROCEDURE — 36415 COLL VENOUS BLD VENIPUNCTURE: CPT

## 2024-12-18 PROCEDURE — 99496 TRANSJ CARE MGMT HIGH F2F 7D: CPT | Performed by: FAMILY MEDICINE

## 2024-12-18 PROCEDURE — 3078F DIAST BP <80 MM HG: CPT | Performed by: FAMILY MEDICINE

## 2024-12-18 PROCEDURE — 80048 BASIC METABOLIC PNL TOTAL CA: CPT

## 2024-12-18 PROCEDURE — 82607 VITAMIN B-12: CPT

## 2024-12-18 PROCEDURE — 84443 ASSAY THYROID STIM HORMONE: CPT

## 2024-12-19 LAB
ANION GAP SERPL CALC-SCNC: 15 MMOL/L (ref 10–20)
BUN SERPL-MCNC: 8 MG/DL (ref 6–23)
CALCIUM SERPL-MCNC: 9.8 MG/DL (ref 8.6–10.6)
CHLORIDE SERPL-SCNC: 102 MMOL/L (ref 98–107)
CO2 SERPL-SCNC: 27 MMOL/L (ref 21–32)
CREAT SERPL-MCNC: 0.63 MG/DL (ref 0.5–1.05)
EGFRCR SERPLBLD CKD-EPI 2021: >90 ML/MIN/1.73M*2
GLUCOSE SERPL-MCNC: 133 MG/DL (ref 74–99)
POTASSIUM SERPL-SCNC: 4.6 MMOL/L (ref 3.5–5.3)
SODIUM SERPL-SCNC: 139 MMOL/L (ref 136–145)
TSH SERPL-ACNC: 1.38 MIU/L (ref 0.44–3.98)
VIT B12 SERPL-MCNC: 173 PG/ML (ref 211–911)

## 2024-12-20 ENCOUNTER — PATIENT OUTREACH (OUTPATIENT)
Dept: PRIMARY CARE | Facility: CLINIC | Age: 64
End: 2024-12-20
Payer: COMMERCIAL

## 2024-12-20 DIAGNOSIS — E53.8 VITAMIN B12 DEFICIENCY: Primary | ICD-10-CM

## 2024-12-20 RX ORDER — CYANOCOBALAMIN 1000 UG/ML
1000 INJECTION, SOLUTION INTRAMUSCULAR; SUBCUTANEOUS
Status: SHIPPED | OUTPATIENT
Start: 2024-12-20 | End: 2025-04-19

## 2024-12-23 ENCOUNTER — APPOINTMENT (OUTPATIENT)
Dept: PRIMARY CARE | Facility: CLINIC | Age: 64
End: 2024-12-23
Payer: COMMERCIAL

## 2024-12-23 PROCEDURE — 96372 THER/PROPH/DIAG INJ SC/IM: CPT | Performed by: FAMILY MEDICINE

## 2025-01-09 ENCOUNTER — TELEPHONE (OUTPATIENT)
Dept: PRIMARY CARE | Facility: CLINIC | Age: 65
End: 2025-01-09
Payer: COMMERCIAL

## 2025-01-09 NOTE — TELEPHONE ENCOUNTER
Florence Willams LPN routed conversation to You14 minutes ago (11:09 AM)     Kristie Mock1 [facility] Clinical Support Staff (supporting You)22 minutes ago (11:01 AM)       Hello,  When I was released from the hospital in December, they sent me home with prescriptions for Potassium and Magnesium.  These two prescriptions are running out.  Does Dr. Henderson want me to continue taking them? If she does, I will need a refill.  Thank you

## 2025-01-17 ENCOUNTER — PATIENT OUTREACH (OUTPATIENT)
Dept: PRIMARY CARE | Facility: CLINIC | Age: 65
End: 2025-01-17
Payer: COMMERCIAL

## 2025-01-21 PROBLEM — E78.00: Chronic | Status: ACTIVE | Noted: 2023-02-17

## 2025-01-21 PROBLEM — R00.2 PALPITATIONS: Status: RESOLVED | Noted: 2022-03-17 | Resolved: 2025-01-21

## 2025-01-21 PROBLEM — E04.2 MULTIPLE THYROID NODULES: Chronic | Status: ACTIVE | Noted: 2019-03-29

## 2025-01-21 PROBLEM — F17.200 NICOTINE DEPENDENCE: Chronic | Status: ACTIVE | Noted: 2023-02-17

## 2025-01-21 PROBLEM — E04.1 THYROID NODULE: Chronic | Status: ACTIVE | Noted: 2023-02-17

## 2025-01-21 PROBLEM — M19.90 ARTHRITIS, DEGENERATIVE: Chronic | Status: ACTIVE | Noted: 2023-02-17

## 2025-01-21 PROBLEM — E55.9 VITAMIN D DEFICIENCY: Chronic | Status: ACTIVE | Noted: 2023-02-17

## 2025-01-21 PROBLEM — E53.8 VITAMIN B12 DEFICIENCY: Chronic | Status: ACTIVE | Noted: 2023-02-17

## 2025-01-21 PROBLEM — E03.9 HYPOTHYROIDISM: Chronic | Status: ACTIVE | Noted: 2023-02-17

## 2025-01-21 PROBLEM — S43.402A SPRAIN OF SHOULDER, LEFT: Status: RESOLVED | Noted: 2023-02-17 | Resolved: 2025-01-21

## 2025-01-21 PROBLEM — G47.00 INSOMNIA: Chronic | Status: ACTIVE | Noted: 2023-02-17

## 2025-01-21 PROBLEM — R73.03 PREDIABETES: Status: RESOLVED | Noted: 2023-04-12 | Resolved: 2025-01-21

## 2025-01-21 PROBLEM — M25.539 PAIN, WRIST: Status: RESOLVED | Noted: 2023-02-17 | Resolved: 2025-01-21

## 2025-01-21 PROBLEM — R92.8 ABNORMAL MAMMOGRAM: Chronic | Status: ACTIVE | Noted: 2023-02-17

## 2025-01-21 PROBLEM — E66.811 CLASS 1 OBESITY DUE TO EXCESS CALORIES WITHOUT SERIOUS COMORBIDITY WITH BODY MASS INDEX (BMI) OF 32.0 TO 32.9 IN ADULT: Chronic | Status: ACTIVE | Noted: 2025-01-21

## 2025-01-21 PROBLEM — E13.69: Chronic | Status: ACTIVE | Noted: 2023-02-17

## 2025-01-21 PROBLEM — H66.93 BOM (BILATERAL OTITIS MEDIA): Status: RESOLVED | Noted: 2023-02-17 | Resolved: 2025-01-21

## 2025-01-21 PROBLEM — E13.69: Chronic | Status: RESOLVED | Noted: 2023-02-17 | Resolved: 2025-01-21

## 2025-01-21 PROBLEM — R01.1 MURMUR, CARDIAC: Chronic | Status: ACTIVE | Noted: 2023-02-17

## 2025-01-21 PROBLEM — J32.9 SINUSITIS: Status: RESOLVED | Noted: 2023-02-17 | Resolved: 2025-01-21

## 2025-01-21 PROBLEM — E66.09 CLASS 1 OBESITY DUE TO EXCESS CALORIES WITHOUT SERIOUS COMORBIDITY WITH BODY MASS INDEX (BMI) OF 32.0 TO 32.9 IN ADULT: Chronic | Status: ACTIVE | Noted: 2025-01-21

## 2025-01-21 PROBLEM — E11.9 TYPE 2 DIABETES MELLITUS: Chronic | Status: ACTIVE | Noted: 2023-02-17

## 2025-01-21 PROBLEM — H69.92 DYSFUNCTION OF LEFT EUSTACHIAN TUBE: Status: RESOLVED | Noted: 2023-02-17 | Resolved: 2025-01-21

## 2025-01-21 PROBLEM — Z86.79 S/P ABLATION OPERATION FOR ARRHYTHMIA: Chronic | Status: ACTIVE | Noted: 2020-10-24

## 2025-01-21 PROBLEM — R91.8 PULMONARY NODULES: Chronic | Status: ACTIVE | Noted: 2023-02-17

## 2025-01-21 PROBLEM — E78.00: Chronic | Status: RESOLVED | Noted: 2023-02-17 | Resolved: 2025-01-21

## 2025-01-21 PROBLEM — Z09 HOSPITAL DISCHARGE FOLLOW-UP: Chronic | Status: RESOLVED | Noted: 2024-12-13 | Resolved: 2025-01-21

## 2025-01-21 PROBLEM — R05.9 COUGH: Status: RESOLVED | Noted: 2023-02-17 | Resolved: 2025-01-21

## 2025-01-21 PROBLEM — Z98.890 S/P ABLATION OPERATION FOR ARRHYTHMIA: Chronic | Status: ACTIVE | Noted: 2020-10-24

## 2025-01-21 PROBLEM — J01.90 ACUTE SINUSITIS: Status: RESOLVED | Noted: 2023-02-17 | Resolved: 2025-01-21
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
## 2025-01-21 PROBLEM — J01.00 ACUTE NON-RECURRENT MAXILLARY SINUSITIS: Status: RESOLVED | Noted: 2023-02-17 | Resolved: 2025-01-21

## 2025-01-21 PROBLEM — I10 HYPERTENSION: Chronic | Status: ACTIVE | Noted: 2023-02-17

## 2025-01-21 PROBLEM — E78.5 HYPERLIPIDEMIA: Status: RESOLVED | Noted: 2023-02-17 | Resolved: 2025-01-21

## 2025-01-21 PROBLEM — E66.9 OBESITY WITH BODY MASS INDEX 30 OR GREATER: Status: RESOLVED | Noted: 2023-02-17 | Resolved: 2025-01-21

## 2025-01-21 PROBLEM — E04.1 THYROID NODULE: Chronic | Status: RESOLVED | Noted: 2023-02-17 | Resolved: 2025-01-21

## 2025-01-21 PROBLEM — I47.19 AV NODAL RE-ENTRY TACHYCARDIA (CMS-HCC): Chronic | Status: ACTIVE | Noted: 2023-02-17

## 2025-01-27 ENCOUNTER — APPOINTMENT (OUTPATIENT)
Dept: PRIMARY CARE | Facility: CLINIC | Age: 65
End: 2025-01-27
Payer: COMMERCIAL

## 2025-01-27 PROCEDURE — 96372 THER/PROPH/DIAG INJ SC/IM: CPT | Performed by: FAMILY MEDICINE

## 2025-01-27 RX ADMIN — CYANOCOBALAMIN 1000 MCG: 1000 INJECTION, SOLUTION INTRAMUSCULAR; SUBCUTANEOUS at 15:11

## 2025-02-21 ENCOUNTER — OFFICE VISIT (OUTPATIENT)
Dept: URGENT CARE | Age: 65
End: 2025-02-21
Payer: COMMERCIAL

## 2025-02-21 VITALS
DIASTOLIC BLOOD PRESSURE: 82 MMHG | TEMPERATURE: 96.3 F | WEIGHT: 183 LBS | SYSTOLIC BLOOD PRESSURE: 129 MMHG | OXYGEN SATURATION: 99 % | HEART RATE: 64 BPM | BODY MASS INDEX: 30.45 KG/M2 | RESPIRATION RATE: 16 BRPM

## 2025-02-21 DIAGNOSIS — J06.9 VIRAL URI: Primary | ICD-10-CM

## 2025-02-21 DIAGNOSIS — R68.89 FLU-LIKE SYMPTOMS: ICD-10-CM

## 2025-02-21 LAB
POC RAPID INFLUENZA A: NEGATIVE
POC RAPID INFLUENZA B: NEGATIVE

## 2025-02-21 RX ORDER — OSELTAMIVIR PHOSPHATE 75 MG/1
75 CAPSULE ORAL EVERY 12 HOURS
Qty: 10 CAPSULE | Refills: 0 | Status: SHIPPED | OUTPATIENT
Start: 2025-02-21 | End: 2025-03-03

## 2025-02-21 NOTE — PATIENT INSTRUCTIONS
You have a viral illness. Continue taking over-the-counter medications to treat your symptoms. Take Tylenol Motrin for any aches and pains. You do not need antibiotics with a viral illness. It will go away on its own. Viral illnesses are self-limiting and take about 7 to 21 days until they are completely out of your system. Any worsening symptoms go to the ER. Otherwise follow-up with your primary care doctor.    Start tamiflu today it was given
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
## 2025-02-24 ENCOUNTER — TELEPHONE (OUTPATIENT)
Dept: PRIMARY CARE | Facility: CLINIC | Age: 65
End: 2025-02-24
Payer: COMMERCIAL

## 2025-02-24 NOTE — TELEPHONE ENCOUNTER
Lashonda from Dr. Hagen office and wanted to know if we received there cardiac clearance from Dr. Layton so that they can get the medical clearance faxed over to her          Cb [phone] ext [phone]    Fax [phone]

## 2025-02-24 NOTE — PROGRESS NOTES
Subjective          HPI:          Kristie Ferro is a 64 y.o. female  is here today for a pre-op clearance     Date of surgery:2/27/28  Who is the doctor performing the surgery: Dr Hagen  What is the patient having done: right total hip arthrplasty  Does the patient have a form that needs to be filled out: yes   When and where are PAT tests being done? Labs were done 2/21/25      H/H- anemia- 10.9/35.7 - stable   12/9/24 - H/H- 11.0/32.1    Sodium 133, was 135     Cardiologist- Dr Layton retired - seeing CNP right now     Office Visit on 02/21/2025   Component Date Value Ref Range Status    POC Rapid Influenza A 02/21/2025 Negative  Negative Final    POC Rapid Influenza B 02/21/2025 Negative  Negative Final   Lab on 12/18/2024   Component Date Value Ref Range Status    Vitamin B12 12/18/2024 173 (L)  211 - 911 pg/mL Final    Thyroid Stimulating Hormone 12/18/2024 1.38  0.44 - 3.98 mIU/L Final    Glucose 12/18/2024 133 (H)  74 - 99 mg/dL Final    Sodium 12/18/2024 139  136 - 145 mmol/L Final    Potassium 12/18/2024 4.6  3.5 - 5.3 mmol/L Final    Chloride 12/18/2024 102  98 - 107 mmol/L Final    Bicarbonate 12/18/2024 27  21 - 32 mmol/L Final    Anion Gap 12/18/2024 15  10 - 20 mmol/L Final    Urea Nitrogen 12/18/2024 8  6 - 23 mg/dL Final    Creatinine 12/18/2024 0.63  0.50 - 1.05 mg/dL Final    eGFR 12/18/2024 >90  >60 mL/min/1.73m*2 Final    Calculations of estimated GFR are performed using the 2021 CKD-EPI Study Refit equation without the race variable for the IDMS-Traceable creatinine methods.  https://jasn.asnjournals.org/content/early/2021/09/22/ASN.3785352745    Calcium 12/18/2024 9.8  8.6 - 10.6 mg/dL Final           Past Surgical History:   Procedure Laterality Date    CHOLECYSTECTOMY  10/24/2017    Cholecystectomy    COLONOSCOPY  02/10/2014    Complete Colonoscopy    ESOPHAGOGASTRODUODENOSCOPY  05/29/2018    Diagnostic Esophagogastroduodenoscopy    GASTRIC BYPASS  08/17/2017    Gastric Surgery For Morbid  Obesity Gastric Bypass    OTHER SURGICAL HISTORY  10/28/2020    Ablation    OTHER SURGICAL HISTORY  10/06/2020    Meniscus repair       RCRI risk factors:   High-risk surgery (intrathoracic, intraabdominal or vascular surgery):no  Coronary artery disease or MI: yes   Congestive heart failure: no   Stroke: no  Insulin treatment for diabetes mellitus: no  Preoperative serum creatinine > 2.0 mg/dL: Cr =0.54     A score of 0 or 1 indicates low risk of major cardiac adverse events. If risk by this calculator is elevated (2 or more), risk is determined by functional capacity.     If excellent functional capacity- 10 mets or more, such as strenuous sports participation- clear for surgery.     If good functional capacity- 7-10 mets, such as run a short distance, climb stairs, doubles tennis- clear for surgery.     If moderate functional capacity- 4-6 mets, such as walk on level ground at 4mph, do light housework- clear for surgery.     If poor functional capacity- 1-3 mets, such as eating, dressing, walking a block or two at 2-3 mph, but cardiac symptoms at any higher activity- need cardiac consultation, consideration of stress test or cath before procedure.         Pt has already gotten cardiac clearance     History Anesthesia - with bypass surgery - 2004 and meniscal tear - 2019 - no problems per pt      Objective        PHYSICAL:    Vitals:    02/25/25 1042   BP: 108/70   BP Location: Left arm   Patient Position: Sitting   BP Cuff Size: Adult   Pulse: 58   Resp: 12   Temp: 36 °C (96.8 °F)   TempSrc: Temporal   SpO2: 98%   Weight: 80.5 kg (177 lb 8 oz)         Pt is A and O x3, NAD  Head- normocephalic and atraumatic,   EYES- conjunctiva- normal   lids- normal  EARS/NOSE- TM's normal, nasopharynx- normal and atraumatic  OROPHARYNX- normal  NECK- supple, FROM  THYROID- NT, normal size, no nodule noted  LYMPH- no cervical lymph nodes palpated   CV- RRR without murmur  PULM- CTA bilaterally, normal respiratory  effort  RESPIRATORY EFFORT- normal , no retractions or nasal flaring   ABD- normoactive BS's , soft , NT, no hepatosplenomegaly palpated  EXT- no edema,NT  SKIN- no abnormal skin lesions noted  NEURO- no focal deficits  PSYCH- pleasant, normal judgement and insight      BP Readings from Last 3 Encounters:   02/25/25 108/70   02/21/25 129/82   12/18/24 116/70         Wt Readings from Last 3 Encounters:   02/25/25 80.5 kg (177 lb 8 oz)   02/21/25 83 kg (183 lb)   12/18/24 83.2 kg (183 lb 8 oz)           BMI Readings from Last 3 Encounters:   02/25/25 29.54 kg/m²   02/21/25 30.45 kg/m²   12/18/24 30.54 kg/m²         The number and complexity of problems addressed is considered moderate.  The amount and/or complexity of data reviewed and analyzed is considered moderate. The risk of complications and/or morbidity/mortality of patient is considered moderate. Overall, this patient encounter is considered a moderate risk visit.          Assessment/Plan        Assessment & Plan  Preop cardiovascular exam       pt is medically cleared for surgery- form completed     Separate cardiac clearance   Hypercholesterolemia         Primary hypertension  lisinopril       Hypothyroidism, unspecified type  Recent TSH was normal        Type 2 diabetes mellitus without complication, without long-term current use of insulin (Multi)  Sees endocrinologist  Last Hgba1c - 5.4       Vitamin D deficiency  Taking supplements          Will complete and fax form  once PAT results have been reviewed

## 2025-02-25 ENCOUNTER — OFFICE VISIT (OUTPATIENT)
Dept: PRIMARY CARE | Facility: CLINIC | Age: 65
End: 2025-02-25
Payer: COMMERCIAL

## 2025-02-25 VITALS
SYSTOLIC BLOOD PRESSURE: 108 MMHG | TEMPERATURE: 96.8 F | DIASTOLIC BLOOD PRESSURE: 70 MMHG | RESPIRATION RATE: 12 BRPM | HEART RATE: 58 BPM | OXYGEN SATURATION: 98 % | WEIGHT: 177.5 LBS | BODY MASS INDEX: 29.54 KG/M2

## 2025-02-25 DIAGNOSIS — E55.9 VITAMIN D DEFICIENCY: Chronic | ICD-10-CM

## 2025-02-25 DIAGNOSIS — I10 PRIMARY HYPERTENSION: Chronic | ICD-10-CM

## 2025-02-25 DIAGNOSIS — E78.00 HYPERCHOLESTEROLEMIA: Chronic | ICD-10-CM

## 2025-02-25 DIAGNOSIS — E11.9 TYPE 2 DIABETES MELLITUS WITHOUT COMPLICATION, WITHOUT LONG-TERM CURRENT USE OF INSULIN (MULTI): Chronic | ICD-10-CM

## 2025-02-25 DIAGNOSIS — Z01.810 PREOP CARDIOVASCULAR EXAM: Primary | Chronic | ICD-10-CM

## 2025-02-25 DIAGNOSIS — E03.9 HYPOTHYROIDISM, UNSPECIFIED TYPE: Chronic | ICD-10-CM

## 2025-02-25 PROCEDURE — 99214 OFFICE O/P EST MOD 30 MIN: CPT | Performed by: FAMILY MEDICINE

## 2025-02-25 PROCEDURE — 3074F SYST BP LT 130 MM HG: CPT | Performed by: FAMILY MEDICINE

## 2025-02-25 PROCEDURE — 3078F DIAST BP <80 MM HG: CPT | Performed by: FAMILY MEDICINE

## 2025-02-25 PROCEDURE — 4010F ACE/ARB THERAPY RXD/TAKEN: CPT | Performed by: FAMILY MEDICINE

## 2025-02-25 NOTE — TELEPHONE ENCOUNTER
Lashonda called in wanted to know if we are going to send back sugerical clearance forms today. Explained waiting for pt to come in for appt today.

## 2025-03-07 ENCOUNTER — PATIENT OUTREACH (OUTPATIENT)
Dept: PRIMARY CARE | Facility: CLINIC | Age: 65
End: 2025-03-07
Payer: COMMERCIAL

## 2025-03-17 ENCOUNTER — TELEPHONE (OUTPATIENT)
Dept: PRIMARY CARE | Facility: CLINIC | Age: 65
End: 2025-03-17
Payer: COMMERCIAL

## 2025-03-17 NOTE — PROGRESS NOTES
Subjective        Kristie Ferro is a 64 y.o. female who presents for      HPI:          Chief Complaint   Patient presents with    Medication Visit     Surgery 2/27, legs are swollen and asking for water pill, surgeons office suggested seeing PCP   Pt had right hip surgery 2/27/25    Pt was in the ED 3/13/25  Has started PT   At that time she had more swelling in her right leg than left-   Exam oer ED notes:  She does have 2-3+ pitting edema of the right lower extremity from the mid thigh distally.  There is no erythema warmth or induration.  It does extend to the dorsum of the foot and the toes.  Distal motor, sensory is intact.  DP pulse present.   Foot is warm and well perfused    U/S was done in ED-  US DVT LOWER RIGHT  IMPRESSION:    Negative study for proximal DVT in the right lower extremity.            Pt had PT this am- plans 2x per week         Today-  No CP. No SOB   Neither leg red or warm  Both legs mild swelling- RLE still somewhat more than right - but much improved per pt         Weight is up 11 pounds     Taking ASA 81 bid       Social History     Tobacco Use   Smoking Status Every Day    Current packs/day: 0.50    Average packs/day: 0.5 packs/day for 46.2 years (23.1 ttl pk-yrs)    Types: Cigarettes    Start date: 1979   Smokeless Tobacco Never         Social History     Substance and Sexual Activity   Alcohol Use Yes    Comment: Occsasionally          Review of Systems:    Review of Systems    Objective        Vitals:    03/21/25 1140   BP: 120/74   BP Location: Left arm   Patient Position: Sitting   BP Cuff Size: Large adult   Pulse: 56   Resp: 12   Temp: 35.8 °C (96.4 °F)   TempSrc: Temporal   SpO2: 97%   Weight: 85.3 kg (188 lb 1.6 oz)           Patient is alert and oriented x 3 , NAD  CV- RRR without murmur  PULM- CTA bilaterally, normal respiratory effort  RESPIRATORY EFFORT- normal , no retractions or nasal flaring   ABD- normoactive BS's   EXT- edema BLE  to knees bilaterally- leaves  sock imprint on LLE ; not red or warm or tender on either LE , RLE slightly > than LLE   FROM BLE  Walks well with her cane   SKIN- no abnormal skin lesions noted            BP Readings from Last 3 Encounters:   03/21/25 120/74   02/25/25 108/70   02/21/25 129/82           Wt Readings from Last 3 Encounters:   03/21/25 85.3 kg (188 lb 1.6 oz)   02/25/25 80.5 kg (177 lb 8 oz)   02/21/25 83 kg (183 lb)         BMI Readings from Last 3 Encounters:   03/21/25 31.30 kg/m²   02/25/25 29.54 kg/m²   02/21/25 30.45 kg/m²           Assessment & Plan  Edema of both lower extremities          Continue with PT per ortho       No diuretic at this time       Increase activity- can now use cane      Elevated legs as much as possible    Suspect edema with improve as pt becomes more active       Continue ASA       Discussed in depth with pt if either extremity, but davon right LE- increases swelling again , would have pain ,redness or warmth , then go to ED         Follow up 2-3 weeks

## 2025-03-21 ENCOUNTER — OFFICE VISIT (OUTPATIENT)
Dept: PRIMARY CARE | Facility: CLINIC | Age: 65
End: 2025-03-21
Payer: COMMERCIAL

## 2025-03-21 VITALS
TEMPERATURE: 96.4 F | WEIGHT: 188.1 LBS | OXYGEN SATURATION: 97 % | SYSTOLIC BLOOD PRESSURE: 120 MMHG | RESPIRATION RATE: 12 BRPM | DIASTOLIC BLOOD PRESSURE: 74 MMHG | BODY MASS INDEX: 31.3 KG/M2 | HEART RATE: 56 BPM

## 2025-03-21 DIAGNOSIS — R60.0 EDEMA OF BOTH LOWER EXTREMITIES: Primary | ICD-10-CM

## 2025-03-21 PROCEDURE — 4010F ACE/ARB THERAPY RXD/TAKEN: CPT | Performed by: FAMILY MEDICINE

## 2025-03-21 PROCEDURE — 3074F SYST BP LT 130 MM HG: CPT | Performed by: FAMILY MEDICINE

## 2025-03-21 PROCEDURE — 3078F DIAST BP <80 MM HG: CPT | Performed by: FAMILY MEDICINE

## 2025-03-21 PROCEDURE — 99213 OFFICE O/P EST LOW 20 MIN: CPT | Performed by: FAMILY MEDICINE

## 2025-04-17 ENCOUNTER — APPOINTMENT (OUTPATIENT)
Dept: PRIMARY CARE | Facility: CLINIC | Age: 65
End: 2025-04-17
Payer: COMMERCIAL

## 2025-04-24 NOTE — PROGRESS NOTES
PRIMARY CARE- OFFICE VISIT         Subjective        Subjective        Kristie Ferro. Is 64 y.o.. female. Here for follow up office visit-       Chief Complaint   Patient presents with    Follow-up       HPI:    How is pt doing today?  Pt states she is feeling well today, still has some swelling in both legs- but improved       Follow up for BLE edema         BP is elevated today     BP Readings from Last 5 Encounters:   04/29/25 153/78   03/21/25 120/74   02/25/25 108/70   02/21/25 129/82   12/18/24 116/70       Weight is down 3 pounds     4 mo ago  (12/18/24)   133 High    139   4.6   102   27   15   8   0.63   >90         Pt had right hip surgery 2/27/25     Pt was in the ED 3/13/25  Has started PT   At that time she had more swelling in her right leg than left-   Exam oer ED notes:  She does have 2-3+ pitting edema of the right lower extremity from the mid thigh distally.  There is no erythema warmth or induration.  It does extend to the dorsum of the foot and the toes.  Distal motor, sensory is intact.  DP pulse present.   Foot is warm and well perfused     U/S was done in ED-  US DVT LOWER RIGHT  IMPRESSION:    Negative study for proximal DVT in the right lower extremity.    Pt has been attending PT per ortho for her hip     Taking ASA               Lab Results   Component Value Date    ALBUMINUR 30 04/11/2023    HGBA1C 5.4 04/25/2024     Lab on 12/18/2024   Component Date Value Ref Range Status    Vitamin B12 12/18/2024 173 (L)  211 - 911 pg/mL Final    Thyroid Stimulating Hormone 12/18/2024 1.38  0.44 - 3.98 mIU/L Final    Glucose 12/18/2024 133 (H)  74 - 99 mg/dL Final    Sodium 12/18/2024 139  136 - 145 mmol/L Final    Potassium 12/18/2024 4.6  3.5 - 5.3 mmol/L Final    Chloride 12/18/2024 102  98 - 107 mmol/L Final    Bicarbonate 12/18/2024 27  21 - 32 mmol/L Final    Anion Gap 12/18/2024 15  10 - 20 mmol/L Final    Urea Nitrogen 12/18/2024 8  6 - 23 mg/dL Final    Creatinine 12/18/2024 0.63  0.50 - 1.05  mg/dL Final    eGFR 12/18/2024 >90  >60 mL/min/1.73m*2 Final    Calculations of estimated GFR are performed using the 2021 CKD-EPI Study Refit equation without the race variable for the IDMS-Traceable creatinine methods.  https://jasn.asnjournals.org/content/early/2021/09/22/ASN.6067527398    Calcium 12/18/2024 9.8  8.6 - 10.6 mg/dL Final   Lab on 06/14/2024   Component Date Value Ref Range Status    Vitamin D, 25-Hydroxy, Total 06/14/2024 44  30 - 100 ng/mL Final    Albumin 06/14/2024 4.1  3.4 - 5.0 g/dL Final    Bilirubin, Total 06/14/2024 0.4  0.0 - 1.2 mg/dL Final    Bilirubin, Direct 06/14/2024 0.1  0.0 - 0.3 mg/dL Final    Alkaline Phosphatase 06/14/2024 77  33 - 136 U/L Final    ALT 06/14/2024 18  7 - 45 U/L Final    Patients treated with Sulfasalazine may generate falsely decreased results for ALT.    AST 06/14/2024 26  9 - 39 U/L Final    Total Protein 06/14/2024 6.7  6.4 - 8.2 g/dL Final    Cholesterol 06/14/2024 204 (H)  0 - 199 mg/dL Final          Age      Desirable   Borderline High   High     0-19 Y     0 - 169       170 - 199     >/= 200    20-24 Y     0 - 189       190 - 224     >/= 225         >24 Y     0 - 199       200 - 239     >/= 240   **All ranges are based on fasting samples. Specific   therapeutic targets will vary based on patient-specific   cardiac risk.    Pediatric guidelines reference:Pediatrics 2011, 128(S5).Adult guidelines reference: NCEP ATPIII Guidelines,EZIO 2001, 258:2486-97    Venipuncture immediately after or during the administration of Metamizole may lead to falsely low results. Testing should be performed immediately prior to Metamizole dosing.    HDL-Cholesterol 06/14/2024 128.5  mg/dL Final      Age       Very Low   Low     Normal    High    0-19 Y    < 35      < 40     40-45     ----  20-24 Y    ----     < 40      >45      ----        >24 Y      ----     < 40     40-60      >60      Cholesterol/HDL Ratio 06/14/2024 1.6   Final      Ref Values  Desirable  < 3.4  High Risk   > 5.0    LDL Calculated 06/14/2024 65  <=99 mg/dL Final                                Near   Borderline      AGE      Desirable  Optimal    High     High     Very High     0-19 Y     0 - 109     ---    110-129   >/= 130     ----    20-24 Y     0 - 119     ---    120-159   >/= 160     ----      >24 Y     0 -  99   100-129  130-159   160-189     >/=190      VLDL 06/14/2024 11  0 - 40 mg/dL Final    Triglycerides 06/14/2024 54  0 - 149 mg/dL Final       Age         Desirable   Borderline High   High     Very High   0 D-90 D    19 - 174         ----         ----        ----  91 D- 9 Y     0 -  74        75 -  99     >/= 100      ----    10-19 Y     0 -  89        90 - 129     >/= 130      ----    20-24 Y     0 - 114       115 - 149     >/= 150      ----         >24 Y     0 - 149       150 - 199    200- 499    >/= 500    Venipuncture immediately after or during the administration of Metamizole may lead to falsely low results. Testing should be performed immediately prior to Metamizole dosing.    Non HDL Cholesterol 06/14/2024 76  0 - 149 mg/dL Final          Age       Desirable   Borderline High   High     Very High     0-19 Y     0 - 119       120 - 144     >/= 145    >/= 160    20-24 Y     0 - 149       150 - 189     >/= 190      ----         >24 Y    30 mg/dL above LDL Cholesterol goal      Glucose 06/14/2024 118 (H)  74 - 99 mg/dL Final    Sodium 06/14/2024 139  136 - 145 mmol/L Final    Potassium 06/14/2024 4.2  3.5 - 5.3 mmol/L Final    Chloride 06/14/2024 103  98 - 107 mmol/L Final    Bicarbonate 06/14/2024 27  21 - 32 mmol/L Final    Anion Gap 06/14/2024 13  10 - 20 mmol/L Final    Urea Nitrogen 06/14/2024 6  6 - 23 mg/dL Final    Creatinine 06/14/2024 0.56  0.50 - 1.05 mg/dL Final    eGFR 06/14/2024 >90  >60 mL/min/1.73m*2 Final    Calculations of estimated GFR are performed using the 2021 CKD-EPI Study Refit equation without the race variable for the IDMS-Traceable creatinine  methods.  https://jasn.asnjournals.org/content/early/2021/09/22/ASN.3873135176    Calcium 06/14/2024 9.8  8.6 - 10.6 mg/dL Final    Vitamin B12 06/14/2024 175 (L)  211 - 911 pg/mL Final           Tobacco Use History[1]         Social History     Substance and Sexual Activity   Alcohol Use Yes    Comment: Occsasionally            Current Medications[2]           Family History[3]                           Lifestyle and medical management to decrease cardiovascular risks.      The following portions of the patient's chart were reviewed in this encounter and updated as appropriate:  Tobacco  Allergies  Meds  Problems  Med Hx  Surg Hx  Fam Hx       REVIEW OF SYSTEMS:    Review of Systems       Objective          Objective      Vitals:    04/29/25 0744   BP: 153/78   BP Location: Left arm   Patient Position: Sitting   BP Cuff Size: Adult   Pulse: 62   Resp: 12   Temp: 35.7 °C (96.3 °F)   TempSrc: Temporal   SpO2: 98%   Weight: 84 kg (185 lb 3.2 oz)                       PHYSICAL:      Patient is alert and oriented x 3 , NAD  HEAD- normocephalic and atraumatic   EYES-conjunctiva-normal, lids - normal  EARS/NOSE- normal external exam   NECK-supple,FROM  CV- RRR without murmur  PULM- CTA bilaterally, normal respiratory effort  RESPIRATORY EFFORT- normal , no retractions or nasal flaring   ABD- normoactive BS's   EXT- no edema,NT  SKIN- no abnormal skin lesions noted  NEURO- no focal deficits  PSYCH- pleasant, normal judgement and insight      BP Readings from Last 3 Encounters:   04/29/25 153/78   03/21/25 120/74   02/25/25 108/70             Wt Readings from Last 3 Encounters:   04/29/25 84 kg (185 lb 3.2 oz)   03/21/25 85.3 kg (188 lb 1.6 oz)   02/25/25 80.5 kg (177 lb 8 oz)           BMI Readings from Last 3 Encounters:   04/29/25 30.82 kg/m²   03/21/25 31.30 kg/m²   02/25/25 29.54 kg/m²               The number and complexity of problems addressed is considered moderate.  The amount and/or complexity of data  reviewed and analyzed is considered moderate. The risk of complications and/or morbidity/mortality of patient is considered moderate. Overall, this patient encounter is considered a moderate risk visit.    Statins:   Although side effects believed to be caused by statins can be annoying, consider the benefits of taking a statin before you decide to stop taking your medication. Remember that statin medications can reduce your risk of a heart attack or stroke, and the risk of life-threatening side effects from statins is very low.    If you have read about the potential side effects of statins, you may be more likely to blame your symptoms on the medication, whether or not they're truly caused by the drug.    Even if your side effects are frustrating, don't stop taking your statin medication for any period of time without talking to your doctor first. Your doctor may be able to come up with an alternative treatment plan that can help you lower your cholesterol without uncomfortable side effects.    Side effects that may occur include muscle aches, elevation liver enzymes, very small incidence memory loss or confusion and increasing blood sugar.      Assessment/Plan        Assessment/Plan      Assessment & Plan  Edema of both lower extremities  Add HCTZ   Keep legs elevated as much as possible        Primary hypertension  Lisinopril        Hypothyroidism, unspecified type  Continue current dose Medication            Plan to recheck BMP at next office visit       Continue medication      Ordered lab      Follow up in 6-8 weeks        Time spent during the office visit includes-    updating and familiarizing myself with patients past medical history, social history, and allergies :  discussing ROS ;  obtaining direct  chief complaint and history of present illness from patient ,  reviewing and reconciling current medication list - both prescription and over the counter medications    clinically examine  patient    determine what testing if any is needed to  diagnose the condition/conditions  with which patient is presenting    using medical knowledge to put all of the information together and decide on best course of action to proceed with diagnosis  and  treatment for the presenting problem or problems      Pre-visit planning, chart review, and face-to-face encounter   Discussion of medications  Coordination with office staff for med adjustments   Final documentation of visit        My total time spent caring for the patient for the day of the encounter (before,during, and after) was =     >30     total minutes.    (Prep+during visit+post visit)         [1]   Social History  Tobacco Use   Smoking Status Every Day    Current packs/day: 0.50    Average packs/day: 0.5 packs/day for 46.3 years (23.2 ttl pk-yrs)    Types: Cigarettes    Start date: 1979   Smokeless Tobacco Never   [2]   Current Outpatient Medications:     cholecalciferol (Vitamin D3) 50 MCG (2000 UT) tablet, Take 1 tablet (2,000 Units) by mouth once daily., Disp: , Rfl:     lisinopril 40 mg tablet, Take 1 tablet (40 mg) by mouth once every day., Disp: , Rfl:     nadolol (Corgard) 40 mg tablet, Take 0.5 tablets (20 mg) by mouth 2 times a day., Disp: , Rfl:     NIFEdipine CC (Adalat CC) 60 mg 24 hr tablet, Take 1 tablet (60 mg) by mouth once daily in the morning. Take before meals. Do not crush, chew, or split., Disp: , Rfl:     rosuvastatin (Crestor) 10 mg tablet, Take 1 tablet (10 mg) by mouth once every day., Disp: , Rfl:   [3]   Family History  Problem Relation Name Age of Onset    Arthritis Mother      Diabetes Mother      Heart failure Father      Stroke Father      Autism Son      Hypertension Other

## 2025-04-29 ENCOUNTER — APPOINTMENT (OUTPATIENT)
Dept: PRIMARY CARE | Facility: CLINIC | Age: 65
End: 2025-04-29
Payer: COMMERCIAL

## 2025-04-29 VITALS
DIASTOLIC BLOOD PRESSURE: 78 MMHG | HEART RATE: 62 BPM | SYSTOLIC BLOOD PRESSURE: 153 MMHG | TEMPERATURE: 96.3 F | WEIGHT: 185.2 LBS | OXYGEN SATURATION: 98 % | RESPIRATION RATE: 12 BRPM | BODY MASS INDEX: 30.82 KG/M2

## 2025-04-29 DIAGNOSIS — E03.9 HYPOTHYROIDISM, UNSPECIFIED TYPE: Chronic | ICD-10-CM

## 2025-04-29 DIAGNOSIS — I10 PRIMARY HYPERTENSION: Chronic | ICD-10-CM

## 2025-04-29 DIAGNOSIS — R60.0 EDEMA OF BOTH LOWER EXTREMITIES: Primary | Chronic | ICD-10-CM

## 2025-04-29 PROCEDURE — 4010F ACE/ARB THERAPY RXD/TAKEN: CPT | Performed by: FAMILY MEDICINE

## 2025-04-29 PROCEDURE — 3077F SYST BP >= 140 MM HG: CPT | Performed by: FAMILY MEDICINE

## 2025-04-29 PROCEDURE — 3078F DIAST BP <80 MM HG: CPT | Performed by: FAMILY MEDICINE

## 2025-04-29 PROCEDURE — 99214 OFFICE O/P EST MOD 30 MIN: CPT | Performed by: FAMILY MEDICINE

## 2025-04-29 RX ORDER — HYDROCHLOROTHIAZIDE 12.5 MG/1
12.5 TABLET ORAL DAILY
Qty: 30 TABLET | Refills: 6 | Status: SHIPPED | OUTPATIENT
Start: 2025-04-29 | End: 2025-11-25

## 2025-06-17 ASSESSMENT — ENCOUNTER SYMPTOMS
HEADACHES: 0
BLURRED VISION: 0
SHORTNESS OF BREATH: 0
ORTHOPNEA: 0
SWEATS: 0
PALPITATIONS: 0
PND: 0
HYPERTENSION: 1

## 2025-06-20 NOTE — PROGRESS NOTES
PRIMARY CARE- OFFICE VISIT         Subjective        Subjective        Kristie Ferro. Is 64 y.o.. female. Here for follow up office visit-       Chief Complaint   Patient presents with    Follow-up     Edema in legs states tolerating  hydrochlorothiazide  well gets minimal swelling in feet when on them all day        HPI:        Follow up for BLE edema, thyroid and BP      Added HCTZ  4/29/25      Here for recheck- BP and weight and edema all improved           Pt is doing well              Lab Results   Component Value Date    ALBUMINUR 30 04/11/2023    HGBA1C 5.4 04/25/2024                       Tobacco Use History[1]         Social History     Substance and Sexual Activity   Alcohol Use Yes    Alcohol/week: 2.0 standard drinks of alcohol    Types: 2 Glasses of wine per week    Comment: Occsasionally            Current Medications[2]           Family History[3]                           Lifestyle and medical management to decrease cardiovascular risks.      The following portions of the patient's chart were reviewed in this encounter and updated as appropriate:  Tobacco  Allergies  Meds  Problems  Med Hx  Surg Hx  Fam Hx       REVIEW OF SYSTEMS:    Review of Systems       Objective          Objective      Vitals:    06/24/25 0759   BP: 122/63   BP Location: Right arm   Patient Position: Sitting   BP Cuff Size: Adult   Pulse: 61   Resp: 14   Temp: 36.3 °C (97.3 °F)   TempSrc: Temporal   SpO2: 99%   Weight: 79.7 kg (175 lb 12.8 oz)                       PHYSICAL:      Patient is alert and oriented x 3 , NAD  HEAD- normocephalic and atraumatic   EYES-conjunctiva-normal, lids - normal  EARS/NOSE- normal external exam   NECK-supple,FROM  CV- RRR without murmur  PULM- CTA bilaterally, normal respiratory effort  RESPIRATORY EFFORT- normal , no retractions or nasal flaring   ABD- normoactive BS's   EXT- no edema,NT  SKIN- no abnormal skin lesions noted  NEURO- no focal deficits  PSYCH- pleasant, normal judgement  and insight      BP Readings from Last 3 Encounters:   06/24/25 122/63   04/29/25 153/78   03/21/25 120/74             Wt Readings from Last 3 Encounters:   06/24/25 79.7 kg (175 lb 12.8 oz)   04/29/25 84 kg (185 lb 3.2 oz)   03/21/25 85.3 kg (188 lb 1.6 oz)           BMI Readings from Last 3 Encounters:   06/24/25 29.25 kg/m²   04/29/25 30.82 kg/m²   03/21/25 31.30 kg/m²               The number and complexity of problems addressed is considered moderate.  The amount and/or complexity of data reviewed and analyzed is considered moderate. The risk of complications and/or morbidity/mortality of patient is considered moderate. Overall, this patient encounter is considered a moderate risk visit.    ACEI:    Do not get pregnant while taking this Medication     Common Side Effects- ACEI    Cough  Dizziness  Feeling tired  Headache  Problems sleeping  Fast heart beat    Call your doctor if you have any of these signs:  Chest pain  Problems breathing or swallowing  Swelling in the face, eyes, lips, tongue, or legs      Assessment/Plan        Assessment/Plan            Assessment & Plan  Primary hypertension  Lisinopril  HCTZ  Nifedipine  corgard  Orders:    Basic Metabolic Panel; Future    Edema of both lower extremities  HCTZ       Hypothyroidism, unspecified type  Not currently on Medication   Orders:    Thyroid Stimulating Hormone; Future    Vitamin D deficiency    Orders:    Vitamin D 25-Hydroxy,Total (for eval of Vitamin D levels); Future                          Continue medication      Ordered lab      Follow up in 6 months        Time spent during the office visit includes-    updating and familiarizing myself with patients past medical history, social history, and allergies :  discussing ROS ;  obtaining direct  chief complaint and history of present illness from patient ,  reviewing and reconciling current medication list - both prescription and over the counter medications    clinically examine  patient    determine what testing if any is needed to  diagnose the condition/conditions  with which patient is presenting    using medical knowledge to put all of the information together and decide on best course of action to proceed with diagnosis  and  treatment for the presenting problem or problems      Pre-visit planning, chart review, and face-to-face encounter   Discussion of medications  Coordination with office staff for med adjustments   Final documentation of visit        My total time spent caring for the patient for the day of the encounter (before,during, and after) was =     >30     total minutes.    (Prep+during visit+post visit)         [1]   Social History  Tobacco Use   Smoking Status Every Day    Current packs/day: 0.50    Average packs/day: 0.5 packs/day for 46.5 years (23.2 ttl pk-yrs)    Types: Cigarettes    Start date: 1979   Smokeless Tobacco Never   [2]   Current Outpatient Medications:     cholecalciferol (Vitamin D3) 50 MCG (2000 UT) tablet, Take 1 tablet (2,000 Units) by mouth once daily., Disp: , Rfl:     hydroCHLOROthiazide (Microzide) 12.5 mg tablet, Take 1 tablet (12.5 mg) by mouth once daily., Disp: 30 tablet, Rfl: 6    lisinopril 40 mg tablet, Take 1 tablet (40 mg) by mouth once every day., Disp: , Rfl:     nadolol (Corgard) 40 mg tablet, Take 0.5 tablets (20 mg) by mouth 2 times a day., Disp: , Rfl:     NIFEdipine CC (Adalat CC) 60 mg 24 hr tablet, Take 1 tablet (60 mg) by mouth once daily in the morning. Take before meals. Do not crush, chew, or split., Disp: , Rfl:     rosuvastatin (Crestor) 10 mg tablet, Take 1 tablet (10 mg) by mouth once every day., Disp: , Rfl:   [3]   Family History  Problem Relation Name Age of Onset    Arthritis Mother Delia     Diabetes Mother Delia     Cancer Mother Delia     Hypertension Mother Delia     Ovarian cancer Mother Delia     Heart failure Father Scottie     Stroke Father Scottie     Heart disease Father Scottie     Autism Son      Hypertension  Other

## 2025-06-24 ENCOUNTER — APPOINTMENT (OUTPATIENT)
Dept: PRIMARY CARE | Facility: CLINIC | Age: 65
End: 2025-06-24
Payer: COMMERCIAL

## 2025-06-24 VITALS
HEART RATE: 61 BPM | RESPIRATION RATE: 14 BRPM | SYSTOLIC BLOOD PRESSURE: 122 MMHG | OXYGEN SATURATION: 99 % | TEMPERATURE: 97.3 F | DIASTOLIC BLOOD PRESSURE: 63 MMHG | WEIGHT: 175.8 LBS | BODY MASS INDEX: 29.25 KG/M2

## 2025-06-24 DIAGNOSIS — E03.9 HYPOTHYROIDISM, UNSPECIFIED TYPE: Chronic | ICD-10-CM

## 2025-06-24 DIAGNOSIS — R60.0 EDEMA OF BOTH LOWER EXTREMITIES: Chronic | ICD-10-CM

## 2025-06-24 DIAGNOSIS — I10 PRIMARY HYPERTENSION: Primary | Chronic | ICD-10-CM

## 2025-06-24 DIAGNOSIS — E55.9 VITAMIN D DEFICIENCY: Chronic | ICD-10-CM

## 2025-06-24 PROCEDURE — 99214 OFFICE O/P EST MOD 30 MIN: CPT | Performed by: FAMILY MEDICINE

## 2025-06-24 PROCEDURE — 3078F DIAST BP <80 MM HG: CPT | Performed by: FAMILY MEDICINE

## 2025-06-24 PROCEDURE — 4010F ACE/ARB THERAPY RXD/TAKEN: CPT | Performed by: FAMILY MEDICINE

## 2025-06-24 PROCEDURE — 3074F SYST BP LT 130 MM HG: CPT | Performed by: FAMILY MEDICINE

## 2025-06-24 ASSESSMENT — PATIENT HEALTH QUESTIONNAIRE - PHQ9
1. LITTLE INTEREST OR PLEASURE IN DOING THINGS: NOT AT ALL
2. FEELING DOWN, DEPRESSED OR HOPELESS: NOT AT ALL
SUM OF ALL RESPONSES TO PHQ9 QUESTIONS 1 AND 2: 0

## 2025-06-27 DIAGNOSIS — E55.9 VITAMIN D DEFICIENCY: Chronic | ICD-10-CM

## 2025-06-27 DIAGNOSIS — E87.1 HYPONATREMIA: Primary | Chronic | ICD-10-CM

## 2025-06-27 DIAGNOSIS — E03.9 HYPOTHYROIDISM, UNSPECIFIED TYPE: Chronic | ICD-10-CM

## 2025-06-27 LAB
25(OH)D3+25(OH)D2 SERPL-MCNC: 49 NG/ML (ref 30–100)
ANION GAP SERPL CALCULATED.4IONS-SCNC: 13 MMOL/L (CALC) (ref 7–17)
BUN SERPL-MCNC: 10 MG/DL (ref 7–25)
BUN/CREAT SERPL: ABNORMAL (CALC) (ref 6–22)
CALCIUM SERPL-MCNC: 9.6 MG/DL (ref 8.6–10.4)
CHLORIDE SERPL-SCNC: 96 MMOL/L (ref 98–110)
CO2 SERPL-SCNC: 23 MMOL/L (ref 20–32)
CREAT SERPL-MCNC: 0.72 MG/DL (ref 0.5–1.05)
EGFRCR SERPLBLD CKD-EPI 2021: 94 ML/MIN/1.73M2
GLUCOSE SERPL-MCNC: 87 MG/DL (ref 65–99)
POTASSIUM SERPL-SCNC: 4.4 MMOL/L (ref 3.5–5.3)
SODIUM SERPL-SCNC: 132 MMOL/L (ref 135–146)
TSH SERPL-ACNC: 0.87 MIU/L (ref 0.4–4.5)

## 2025-06-30 PROBLEM — E87.1 HYPONATREMIA: Chronic | Status: ACTIVE | Noted: 2025-06-30

## 2025-09-02 ASSESSMENT — ENCOUNTER SYMPTOMS
SHORTNESS OF BREATH: 1
SHORTNESS OF BREATH: 1

## 2025-09-03 ENCOUNTER — APPOINTMENT (OUTPATIENT)
Dept: PRIMARY CARE | Facility: CLINIC | Age: 65
End: 2025-09-03
Payer: COMMERCIAL

## 2025-09-03 PROBLEM — E83.42 HYPOMAGNESEMIA: Chronic | Status: ACTIVE | Noted: 2025-09-03

## 2025-09-03 PROBLEM — E87.6 HYPOKALEMIA: Chronic | Status: ACTIVE | Noted: 2025-09-03

## 2025-09-03 PROBLEM — R06.00 DYSPNEA: Chronic | Status: ACTIVE | Noted: 2025-09-03

## 2025-09-04 ENCOUNTER — OFFICE VISIT (OUTPATIENT)
Dept: PRIMARY CARE | Facility: CLINIC | Age: 65
End: 2025-09-04
Payer: COMMERCIAL

## 2025-09-04 VITALS
HEART RATE: 53 BPM | BODY MASS INDEX: 29.8 KG/M2 | DIASTOLIC BLOOD PRESSURE: 70 MMHG | TEMPERATURE: 97.3 F | WEIGHT: 179.1 LBS | RESPIRATION RATE: 14 BRPM | SYSTOLIC BLOOD PRESSURE: 131 MMHG | OXYGEN SATURATION: 97 %

## 2025-09-04 DIAGNOSIS — R00.2 PALPITATIONS: Chronic | ICD-10-CM

## 2025-09-04 DIAGNOSIS — E87.6 HYPOKALEMIA: Chronic | ICD-10-CM

## 2025-09-04 DIAGNOSIS — I47.10 PSVT (PAROXYSMAL SUPRAVENTRICULAR TACHYCARDIA): ICD-10-CM

## 2025-09-04 DIAGNOSIS — E87.1 HYPONATREMIA: Chronic | ICD-10-CM

## 2025-09-04 DIAGNOSIS — I10 PRIMARY HYPERTENSION: Chronic | ICD-10-CM

## 2025-09-04 DIAGNOSIS — I47.19 AV NODAL RE-ENTRY TACHYCARDIA: ICD-10-CM

## 2025-09-04 DIAGNOSIS — E83.42 HYPOMAGNESEMIA: Chronic | ICD-10-CM

## 2025-09-04 DIAGNOSIS — Z09 HOSPITAL DISCHARGE FOLLOW-UP: Primary | Chronic | ICD-10-CM

## 2025-09-04 DIAGNOSIS — E11.9 TYPE 2 DIABETES MELLITUS WITHOUT COMPLICATION, WITHOUT LONG-TERM CURRENT USE OF INSULIN: Chronic | ICD-10-CM

## 2025-09-04 DIAGNOSIS — D64.9 ANEMIA, UNSPECIFIED TYPE: Chronic | ICD-10-CM

## 2025-09-04 DIAGNOSIS — R06.00 DYSPNEA, UNSPECIFIED TYPE: Chronic | ICD-10-CM

## 2025-09-04 PROCEDURE — 4010F ACE/ARB THERAPY RXD/TAKEN: CPT | Performed by: FAMILY MEDICINE

## 2025-09-04 PROCEDURE — 3075F SYST BP GE 130 - 139MM HG: CPT | Performed by: FAMILY MEDICINE

## 2025-09-04 PROCEDURE — 99214 OFFICE O/P EST MOD 30 MIN: CPT | Performed by: FAMILY MEDICINE

## 2025-09-04 PROCEDURE — 3078F DIAST BP <80 MM HG: CPT | Performed by: FAMILY MEDICINE

## 2025-09-05 ENCOUNTER — RESULTS FOLLOW-UP (OUTPATIENT)
Dept: PRIMARY CARE | Facility: CLINIC | Age: 65
End: 2025-09-05
Payer: COMMERCIAL

## 2025-09-05 DIAGNOSIS — D64.9 ANEMIA, UNSPECIFIED TYPE: Primary | ICD-10-CM

## 2025-09-05 LAB
ANION GAP SERPL CALCULATED.4IONS-SCNC: 13 MMOL/L (CALC) (ref 7–17)
APPEARANCE UR: CLEAR
BILIRUB UR QL STRIP: NEGATIVE
BUN SERPL-MCNC: 10 MG/DL (ref 7–25)
BUN/CREAT SERPL: NORMAL (CALC) (ref 6–22)
CALCIUM SERPL-MCNC: 9.9 MG/DL (ref 8.6–10.4)
CHLORIDE SERPL-SCNC: 102 MMOL/L (ref 98–110)
CO2 SERPL-SCNC: 23 MMOL/L (ref 20–32)
COLOR UR: NORMAL
CREAT SERPL-MCNC: 0.73 MG/DL (ref 0.5–1.05)
EGFRCR SERPLBLD CKD-EPI 2021: 92 ML/MIN/1.73M2
FERRITIN SERPL-MCNC: 31 NG/ML (ref 16–288)
FOLATE SERPL-MCNC: 6.8 NG/ML
GLUCOSE SERPL-MCNC: 89 MG/DL (ref 65–139)
GLUCOSE UR QL STRIP: NEGATIVE
HGB UR QL STRIP: NEGATIVE
IRON SERPL-MCNC: 195 MCG/DL (ref 45–160)
KETONES UR QL STRIP: NEGATIVE
LEUKOCYTE ESTERASE UR QL STRIP: NEGATIVE
MAGNESIUM SERPL-MCNC: 1.2 MG/DL (ref 1.5–2.5)
NITRITE UR QL STRIP: NEGATIVE
PH UR STRIP: 7 [PH] (ref 5–8)
POTASSIUM SERPL-SCNC: 4.8 MMOL/L (ref 3.5–5.3)
PROT UR QL STRIP: NEGATIVE
SODIUM SERPL-SCNC: 138 MMOL/L (ref 135–146)
SP GR UR STRIP: 1.01 (ref 1–1.03)
VIT B12 SERPL-MCNC: 200 PG/ML (ref 200–1100)

## 2025-10-23 ENCOUNTER — APPOINTMENT (OUTPATIENT)
Dept: PRIMARY CARE | Facility: CLINIC | Age: 65
End: 2025-10-23
Payer: COMMERCIAL

## 2025-12-23 ENCOUNTER — APPOINTMENT (OUTPATIENT)
Dept: PRIMARY CARE | Facility: CLINIC | Age: 65
End: 2025-12-23
Payer: COMMERCIAL